# Patient Record
Sex: MALE | Race: WHITE | NOT HISPANIC OR LATINO | Employment: FULL TIME | URBAN - METROPOLITAN AREA
[De-identification: names, ages, dates, MRNs, and addresses within clinical notes are randomized per-mention and may not be internally consistent; named-entity substitution may affect disease eponyms.]

---

## 2017-03-16 DIAGNOSIS — M17.32 POST-TRAUMATIC OSTEOARTHRITIS OF LEFT KNEE: ICD-10-CM

## 2017-03-16 DIAGNOSIS — M19.041 PRIMARY OSTEOARTHRITIS OF BOTH HANDS: ICD-10-CM

## 2017-03-16 DIAGNOSIS — M79.662 PAIN OF LEFT LOWER LEG: ICD-10-CM

## 2017-03-16 DIAGNOSIS — M19.042 PRIMARY OSTEOARTHRITIS OF BOTH HANDS: ICD-10-CM

## 2017-03-16 RX ORDER — CELECOXIB 200 MG/1
200 CAPSULE ORAL 2 TIMES DAILY
Qty: 180 CAP | Refills: 0 | Status: SHIPPED | OUTPATIENT
Start: 2017-03-16 | End: 2017-05-17 | Stop reason: SDUPTHER

## 2017-03-16 RX ORDER — TRAMADOL HYDROCHLORIDE 50 MG/1
50-100 TABLET ORAL 2 TIMES DAILY PRN
Qty: 60 TAB | Refills: 0 | Status: SHIPPED
Start: 2017-03-16 | End: 2017-04-25 | Stop reason: SDUPTHER

## 2017-03-16 NOTE — TELEPHONE ENCOUNTER
"Was the patient seen in the last year in this department? No     Does patient have an active prescription for medications requested? No     Received Request Via: Pharmacy     Last seen\" 03/11/2016 Slots       "

## 2017-03-16 NOTE — Clinical Note
March 16, 2017        Horacio Camargo [address]  [facility] [address]        Dear Horacio:    .This letter is to inform you the you are due for an annual appointment. Please contact our scheduling department at [phone] To schedule       If you have any questions or concerns, please don't hesitate to call.        Sincerely,        Daniel Hernandez M.D.    Electronically Signed

## 2017-03-16 NOTE — TELEPHONE ENCOUNTER
Refill done, please fax. Patient is due for annual appointment. Please have patient schedule.  Daniel Hernandez M.D.

## 2017-04-25 DIAGNOSIS — M17.32 POST-TRAUMATIC OSTEOARTHRITIS OF LEFT KNEE: ICD-10-CM

## 2017-04-25 DIAGNOSIS — M19.042 PRIMARY OSTEOARTHRITIS OF BOTH HANDS: ICD-10-CM

## 2017-04-25 DIAGNOSIS — M79.662 PAIN OF LEFT LOWER LEG: ICD-10-CM

## 2017-04-25 DIAGNOSIS — M19.041 PRIMARY OSTEOARTHRITIS OF BOTH HANDS: ICD-10-CM

## 2017-04-25 RX ORDER — TRAMADOL HYDROCHLORIDE 50 MG/1
50-100 TABLET ORAL 2 TIMES DAILY PRN
Qty: 60 TAB | Refills: 0 | Status: SHIPPED
Start: 2017-04-25 | End: 2017-05-17 | Stop reason: SDUPTHER

## 2017-04-25 NOTE — TELEPHONE ENCOUNTER
Was the patient seen in the last year in this department? Yes     Does patient have an active prescription for medications requested? No     Received Request Via: Pharmacy     Last seen: 03/11/2016 Slots

## 2017-05-02 ENCOUNTER — APPOINTMENT (OUTPATIENT)
Dept: MEDICAL GROUP | Facility: MEDICAL CENTER | Age: 57
End: 2017-05-02
Payer: COMMERCIAL

## 2017-05-17 ENCOUNTER — OFFICE VISIT (OUTPATIENT)
Dept: MEDICAL GROUP | Facility: MEDICAL CENTER | Age: 57
End: 2017-05-17
Payer: COMMERCIAL

## 2017-05-17 VITALS
RESPIRATION RATE: 16 BRPM | TEMPERATURE: 97.9 F | OXYGEN SATURATION: 96 % | HEIGHT: 69 IN | SYSTOLIC BLOOD PRESSURE: 130 MMHG | DIASTOLIC BLOOD PRESSURE: 86 MMHG | WEIGHT: 207 LBS | HEART RATE: 76 BPM | BODY MASS INDEX: 30.66 KG/M2

## 2017-05-17 DIAGNOSIS — M19.041 PRIMARY OSTEOARTHRITIS OF BOTH HANDS: ICD-10-CM

## 2017-05-17 DIAGNOSIS — Z00.00 ANNUAL PHYSICAL EXAM: ICD-10-CM

## 2017-05-17 DIAGNOSIS — M19.042 PRIMARY OSTEOARTHRITIS OF BOTH HANDS: ICD-10-CM

## 2017-05-17 DIAGNOSIS — M54.41 CHRONIC MIDLINE LOW BACK PAIN WITH RIGHT-SIDED SCIATICA: ICD-10-CM

## 2017-05-17 DIAGNOSIS — E66.9 OBESITY (BMI 30-39.9): ICD-10-CM

## 2017-05-17 DIAGNOSIS — F51.01 PRIMARY INSOMNIA: ICD-10-CM

## 2017-05-17 DIAGNOSIS — G89.29 CHRONIC MIDLINE LOW BACK PAIN WITH RIGHT-SIDED SCIATICA: ICD-10-CM

## 2017-05-17 DIAGNOSIS — E80.4 GILBERT'S DISEASE: ICD-10-CM

## 2017-05-17 DIAGNOSIS — K21.9 GASTROESOPHAGEAL REFLUX DISEASE, ESOPHAGITIS PRESENCE NOT SPECIFIED: ICD-10-CM

## 2017-05-17 DIAGNOSIS — M17.32 POST-TRAUMATIC OSTEOARTHRITIS OF LEFT KNEE: ICD-10-CM

## 2017-05-17 PROCEDURE — 99396 PREV VISIT EST AGE 40-64: CPT | Performed by: FAMILY MEDICINE

## 2017-05-17 RX ORDER — CELECOXIB 200 MG/1
200 CAPSULE ORAL 2 TIMES DAILY
Qty: 180 CAP | Refills: 3 | Status: SHIPPED | OUTPATIENT
Start: 2017-05-17 | End: 2017-12-08

## 2017-05-17 RX ORDER — TRAMADOL HYDROCHLORIDE 50 MG/1
50-100 TABLET ORAL 2 TIMES DAILY PRN
Qty: 180 TAB | Refills: 1 | Status: SHIPPED
Start: 2017-05-17 | End: 2017-08-24 | Stop reason: SDUPTHER

## 2017-05-17 RX ORDER — CALCIUM CARBONATE 500 MG/1
500 TABLET, CHEWABLE ORAL PRN
COMMUNITY
Start: 2017-05-17

## 2017-05-17 NOTE — MR AVS SNAPSHOT
"        Horacio Mcgarry   2017 4:40 PM   Office Visit   MRN: [id number]    Department:  [facility]   Dept Phone:  [phone]    Description:  Male : 1960   Provider:  Daniel Hernandez M.D.           Reason for Visit     Follow-Up Medication      Allergies as of 2017     No Known Allergies      You were diagnosed with     Annual physical exam   [513731]       Post-traumatic osteoarthritis of left knee   [189682]       Primary osteoarthritis of both hands   [1534062]       Chronic midline low back pain with right-sided sciatica   [4072542]       Gilbert's disease   [355347]       Primary insomnia   [171551]       Gastroesophageal reflux disease, esophagitis presence not specified   [0258346]       Obesity (BMI 30-39.9)   [733108]         Vital Signs     Blood Pressure Pulse Temperature Respirations Height Weight    130/86 mmHg 76 36.6 °C (97.9 °F) 16 1.753 m (5' 9\") 93.895 kg (207 lb)    Body Mass Index Oxygen Saturation Smoking Status             30.55 kg/m2 96% Never Smoker          Basic Information     Date Of Birth Sex Race Ethnicity Preferred Language    1960 Male White Non- English      Problem List              ICD-10-CM Priority Class Noted - Resolved    Insomnia G47.00   2014 - Present    Osteoarthritis of hand M19.049   2014 - Present    Post-traumatic osteoarthritis of left knee M17.32   2014 - Present    GERD (gastroesophageal reflux disease) K21.9   2014 - Present    Gilbert's disease E80.4   2014 - Present    Chronic midline low back pain with right-sided sciatica M54.41, G89.29   2017 - Present    Obesity (BMI 30-39.9) E66.9   2017 - Present      Health Maintenance        Date Due Completion Dates    IMM DTaP/Tdap/Td Vaccine (1 - Tdap) 1980 ---    COLONOSCOPY 2011 ---            Current Immunizations     Influenza TIV (IM) 10/28/2014      Below and/or attached are the medications your provider expects you to take. Review " all of your home medications and newly ordered medications with your provider and/or pharmacist. Follow medication instructions as directed by your provider and/or pharmacist. Please keep your medication list with you and share with your provider. Update the information when medications are discontinued, doses are changed, or new medications (including over-the-counter products) are added; and carry medication information at all times in the event of emergency situations     Allergies:  No Known Allergies          Medications  Valid as of: May 17, 2017 -  5:28 PM    Generic Name Brand Name Tablet Size Instructions for use    Calcium Carbonate Antacid (Chew Tab) TUMS 500 MG Take 1 Tab by mouth 1 time daily as needed (heartburn).        Celecoxib (Cap) CELEBREX 200 MG Take 1 Cap by mouth 2 times a day.        TraMADol HCl (Tab) ULTRAM 50 MG Take 1-2 Tabs by mouth 2 times a day as needed for Severe Pain.        Triazolam (Tab) HALCION 0.25 MG Take 1-2 Tabs by mouth at bedtime as needed (sleep).        .                 Medicines prescribed today were sent to:     [facility] [address]     [address] [facility] [address]    Phone: [phone] Fax: [phone]    Open 24 Hours?: No      Medication refill instructions:       If your prescription bottle indicates you have medication refills left, it is not necessary to call your provider’s office. Please contact your pharmacy and they will refill your medication.    If your prescription bottle indicates you do not have any refills left, you may request refills at any time through one of the following ways: The online [email or web address] system (except Urgent Care), by calling your provider’s office, or by asking your pharmacy to contact your provider’s office with a refill request. Medication refills are processed only during regular business hours and may not be available until the next business day. Your provider may request additional  information or to have a follow-up visit with you prior to refilling your medication.   *Please Note: Medication refills are assigned a new Rx number when refilled electronically. Your pharmacy may indicate that no refills were authorized even though a new prescription for the same medication is available at the pharmacy. Please request the medicine by name with the pharmacy before contacting your provider for a refill.        Your To Do List     Future Labs/Procedures Complete By Expires    CBC WITH DIFFERENTIAL  As directed 5/18/2018    COMP METABOLIC PANEL  As directed 5/18/2018    LIPID PROFILE  As directed 5/18/2018    PROSTATE SPECIFIC AG SCREENING  As directed 5/18/2018    TSH WITH REFLEX TO FT4  As directed 5/17/2018    VITAMIN D,25 HYDROXY  As directed 5/18/2018         MyChart Access Code: Activation code not generated  Current Blooie Status: Active

## 2017-05-18 NOTE — PROGRESS NOTES
"Subjective:   Horacio Mcgarry is a 56 y.o. male here today for annual    Patient is tolerating Celebrex 200 mg at night and tramadol 100 mg at night for his various arthritis, and left knee, both hands and lower back. He states his lower back is improved when he does yoga. Recently has been doing lifting for his wife's mother's house, that has caused a flareup of his back pain.    Patient is using triazolam 0.5 mg at night, which does help somewhat.    Patient is taking Tums about 2-3 times per week for acid reflux.    Current medicines (including changes today)  Current Outpatient Prescriptions   Medication Sig Dispense Refill   • celecoxib (CELEBREX) 200 MG Cap Take 1 Cap by mouth 2 times a day. 180 Cap 3   • tramadol (ULTRAM) 50 MG Tab Take 1-2 Tabs by mouth 2 times a day as needed for Severe Pain. 180 Tab 1   • calcium carbonate (TUMS) 500 MG Chew Tab Take 1 Tab by mouth 1 time daily as needed (heartburn).     • triazolam (HALCION) 0.25 MG Tab Take 1-2 Tabs by mouth at bedtime as needed (sleep). 60 Tab 5     No current facility-administered medications for this visit.     He  has a past medical history of Insomnia (11/7/2014); Osteoarthritis of hand (11/7/2014); Pain of left lower leg (11/7/2014); and GERD (gastroesophageal reflux disease) (11/7/2014).    ROS   No chest pain, no shortness of breath, no abdominal pain       Objective:     Blood pressure 130/86, pulse 76, temperature 36.6 °C (97.9 °F), resp. rate 16, height 1.753 m (5' 9\"), weight 93.895 kg (207 lb), SpO2 96 %. Body mass index is 30.55 kg/(m^2).   Physical Exam:  Constitutional: Alert, no distress.  Skin: Warm, dry, good turgor, no rashes in visible areas.  Eye: Equal, round and reactive, conjunctiva clear, lids normal.  ENMT: Lips without lesions, good dentition, oropharynx clear.  Neck: Trachea midline, no masses, no thyromegaly. No cervical or supraclavicular lymphadenopathy  Respiratory: Unlabored respiratory effort, lungs clear to auscultation, " no wheezes, no ronchi.  Cardiovascular: Normal S1, S2, no murmur, no edema.  Abdomen: Soft, non-tender, no masses, no hepatosplenomegaly.  Psych: Alert and oriented x3, normal affect and mood.  Rectal: No external lesions, normal sphincter tone, normal size prostate with no nodularity.      Assessment and Plan:   The following treatment plan was discussed    1. Annual physical exam  Check labs and call with results.  - PROSTATE SPECIFIC AG SCREENING; Future  - CBC WITH DIFFERENTIAL; Future  - COMP METABOLIC PANEL; Future  - LIPID PROFILE; Future  - TSH WITH REFLEX TO FT4; Future  - VITAMIN D,25 HYDROXY; Future    2. Post-traumatic osteoarthritis of left knee  Controlled. Continue current medication.  - celecoxib (CELEBREX) 200 MG Cap; Take 1 Cap by mouth 2 times a day.  Dispense: 180 Cap; Refill: 3  - tramadol (ULTRAM) 50 MG Tab; Take 1-2 Tabs by mouth 2 times a day as needed for Severe Pain.  Dispense: 180 Tab; Refill: 1    3. Primary osteoarthritis of both hands  Controlled. Continue current medication.  - celecoxib (CELEBREX) 200 MG Cap; Take 1 Cap by mouth 2 times a day.  Dispense: 180 Cap; Refill: 3  - tramadol (ULTRAM) 50 MG Tab; Take 1-2 Tabs by mouth 2 times a day as needed for Severe Pain.  Dispense: 180 Tab; Refill: 1    4. Chronic midline low back pain with right-sided sciatica  Controlled. Continue current medication.    5. Gilbert's disease  Check labs and call with results.    6. Primary insomnia  Controlled. Continue current medication.    7. Gastroesophageal reflux disease, esophagitis presence not specified  Advised patient that if GERD increases, we should consider daily medication.    8. Obesity (BMI 30-39.9)  - Patient identified as having weight management issue.  Appropriate orders and counseling given.      Followup: Return in about 1 year (around 5/17/2018) for Annual, Long.

## 2017-05-19 ENCOUNTER — TELEPHONE (OUTPATIENT)
Dept: MEDICAL GROUP | Facility: MEDICAL CENTER | Age: 57
End: 2017-05-19

## 2017-05-19 NOTE — TELEPHONE ENCOUNTER
----- Message from Daniel Hernandez M.D. sent at 5/19/2017  8:48 AM PDT -----  Please notify patient that PSA is 2.9 and last year it was 3.9. Less than 4 is the normal range, so PSA going down is a good thing.  We should recheck PSA every 1-2 years.  Daniel Hernandez M.D.

## 2017-08-24 DIAGNOSIS — M17.32 POST-TRAUMATIC OSTEOARTHRITIS OF LEFT KNEE: ICD-10-CM

## 2017-08-24 DIAGNOSIS — M19.041 PRIMARY OSTEOARTHRITIS OF BOTH HANDS: ICD-10-CM

## 2017-08-24 DIAGNOSIS — M19.042 PRIMARY OSTEOARTHRITIS OF BOTH HANDS: ICD-10-CM

## 2017-08-25 RX ORDER — TRAMADOL HYDROCHLORIDE 50 MG/1
50-100 TABLET ORAL 2 TIMES DAILY PRN
Qty: 180 TAB | Refills: 1 | Status: SHIPPED
Start: 2017-08-25 | End: 2017-12-08

## 2017-08-25 NOTE — TELEPHONE ENCOUNTER
Was the patient seen in the last year in this department? Yes     Does patient have an active prescription for medications requested? No     Received Request Via: Pharmacy     Last seen: 05/17/2017 Slots

## 2017-12-08 ENCOUNTER — HOSPITAL ENCOUNTER (OUTPATIENT)
Dept: LAB | Facility: MEDICAL CENTER | Age: 57
End: 2017-12-08
Attending: FAMILY MEDICINE
Payer: COMMERCIAL

## 2017-12-08 ENCOUNTER — OFFICE VISIT (OUTPATIENT)
Dept: MEDICAL GROUP | Facility: MEDICAL CENTER | Age: 57
End: 2017-12-08
Payer: COMMERCIAL

## 2017-12-08 VITALS
SYSTOLIC BLOOD PRESSURE: 132 MMHG | WEIGHT: 208.2 LBS | HEIGHT: 69 IN | BODY MASS INDEX: 30.84 KG/M2 | HEART RATE: 54 BPM | DIASTOLIC BLOOD PRESSURE: 82 MMHG | OXYGEN SATURATION: 97 % | TEMPERATURE: 97.5 F

## 2017-12-08 DIAGNOSIS — E66.9 OBESITY (BMI 30-39.9): ICD-10-CM

## 2017-12-08 DIAGNOSIS — Z01.00 ENCOUNTER FOR VISION SCREENING: ICD-10-CM

## 2017-12-08 DIAGNOSIS — K21.9 GASTROESOPHAGEAL REFLUX DISEASE, ESOPHAGITIS PRESENCE NOT SPECIFIED: ICD-10-CM

## 2017-12-08 PROCEDURE — 83013 H PYLORI (C-13) BREATH: CPT

## 2017-12-08 PROCEDURE — 99214 OFFICE O/P EST MOD 30 MIN: CPT | Performed by: FAMILY MEDICINE

## 2017-12-08 RX ORDER — OMEPRAZOLE 40 MG/1
40 CAPSULE, DELAYED RELEASE ORAL DAILY
Qty: 90 CAP | Refills: 1 | Status: SHIPPED | OUTPATIENT
Start: 2017-12-08 | End: 2018-02-23

## 2017-12-08 RX ORDER — SUCRALFATE 1 G/1
1 TABLET ORAL
Qty: 120 TAB | Refills: 0 | Status: SHIPPED | OUTPATIENT
Start: 2017-12-08 | End: 2019-03-22

## 2017-12-08 ASSESSMENT — PATIENT HEALTH QUESTIONNAIRE - PHQ9: CLINICAL INTERPRETATION OF PHQ2 SCORE: 0

## 2017-12-08 NOTE — ASSESSMENT & PLAN NOTE
Having severe heart burn for the last 3 months. Associated with eating foods, tomato sauce, chocolate, coffee, spicy food and not eating makes it worse. Symptoms occur all day. Appetite is reduced.  Tried Prilosec for 4 weeks and Pepcid for 2 weeks with no improvement. Sleeping on a few pillows to help.  EGD in 2007 which was negative.  Worse than ever before.  Associated Sore throat.  No NSAID use recently. Not gaining weight, actually he says he has lost about 10 pounds recently.  No dark stools, no vomiting, no constipation, no fever.

## 2017-12-08 NOTE — PROGRESS NOTES
"Subjective:   Horacio Mcgarry is a 57 y.o. male here today for GERD    GERD (gastroesophageal reflux disease)  Having severe heart burn for the last 3 months. Associated with eating foods, tomato sauce, chocolate, coffee, spicy food and not eating makes it worse. Symptoms occur all day. Appetite is reduced.  Tried Prilosec for 4 weeks and Pepcid for 2 weeks with no improvement. Sleeping on a few pillows to help.  EGD in 2007 which was negative.  Worse than ever before.  Associated Sore throat.  No NSAID use recently. Not gaining weight, actually he says he has lost about 10 pounds recently.  No dark stools, no vomiting, no constipation, no fever.    Obesity (BMI 30-39.9)  Working out regularly. He states he has lost 10 pounds.         Current medicines (including changes today)  Current Outpatient Prescriptions   Medication Sig Dispense Refill   • omeprazole (PRILOSEC) 40 MG delayed-release capsule Take 1 Cap by mouth every day. 90 Cap 1   • sucralfate (CARAFATE) 1 GM Tab Take 1 Tab by mouth 4 Times a Day,Before Meals and at Bedtime. 120 Tab 0   • triazolam (HALCION) 0.25 MG Tab Take 1-2 Tabs by mouth at bedtime as needed (sleep). 60 Tab 5   • calcium carbonate (TUMS) 500 MG Chew Tab Take 1 Tab by mouth 1 time daily as needed (heartburn).       No current facility-administered medications for this visit.      He  has a past medical history of GERD (gastroesophageal reflux disease) (11/7/2014); Insomnia (11/7/2014); Osteoarthritis of hand (11/7/2014); and Pain of left lower leg (11/7/2014).    ROS   See HPI       Objective:     Blood pressure 132/82, pulse (!) 54, temperature 36.4 °C (97.5 °F), height 1.753 m (5' 9\"), weight 94.4 kg (208 lb 3.2 oz), SpO2 97 %. Body mass index is 30.75 kg/m².   Physical Exam:  Constitutional: Alert, no distress.  Skin: Warm, dry, good turgor, no rashes in visible areas.  Eye: Equal, round and reactive, conjunctiva clear, lids normal.  Abdomen: Soft, non-tender, no masses, no " hepatosplenomegaly.  Psych: Alert and oriented x3, normal affect and mood.        Assessment and Plan:   The following treatment plan was discussed    1. Gastroesophageal reflux disease, esophagitis presence not specified  Check H. pylori breath test, call with results. Start patient on Prilosec 40 mg daily and Carafate. If no improvement patient will need referral for EGD.  - H. PYLORI BREATH TEST  - omeprazole (PRILOSEC) 40 MG delayed-release capsule; Take 1 Cap by mouth every day.  Dispense: 90 Cap; Refill: 1    2. Obesity (BMI 30-39.9)  - Patient identified as having weight management issue.  Appropriate orders and counseling given.    3. Encounter for vision screening  Referral to optometry per patient's request.  - REFERRAL TO OPTOMETRY      Followup: Return if symptoms worsen or fail to improve.

## 2017-12-09 LAB — UREA BREATH TEST QL: NEGATIVE

## 2017-12-11 ENCOUNTER — TELEPHONE (OUTPATIENT)
Dept: MEDICAL GROUP | Facility: MEDICAL CENTER | Age: 57
End: 2017-12-11

## 2018-01-04 ENCOUNTER — PATIENT MESSAGE (OUTPATIENT)
Dept: MEDICAL GROUP | Facility: MEDICAL CENTER | Age: 58
End: 2018-01-04

## 2018-01-04 DIAGNOSIS — K21.9 GASTROESOPHAGEAL REFLUX DISEASE, ESOPHAGITIS PRESENCE NOT SPECIFIED: ICD-10-CM

## 2018-01-04 NOTE — TELEPHONE ENCOUNTER
From: Horacio Mcgarry  To: Daniel Hernandez M.D.  Sent: 1/4/2018 2:48 PM PST  Subject: Procedure Question    Hi Dr. Gill, I am still having reflux issues, it got a little better the last month, but is back pretty heavy, I have changed my diet drastically, and have lost some more weight the last month due to not eating much, should I see you again or is the next step a gastroenterologist ? Thanks Horacio Mcgarry

## 2018-01-19 ENCOUNTER — APPOINTMENT (OUTPATIENT)
Dept: RADIOLOGY | Facility: MEDICAL CENTER | Age: 58
End: 2018-01-19
Attending: INTERNAL MEDICINE
Payer: COMMERCIAL

## 2018-01-19 DIAGNOSIS — R12 HEARTBURN: ICD-10-CM

## 2018-01-19 PROCEDURE — 74220 X-RAY XM ESOPHAGUS 1CNTRST: CPT

## 2018-01-19 PROCEDURE — 700112 HCHG RX REV CODE 229: Performed by: INTERNAL MEDICINE

## 2018-01-19 PROCEDURE — A9270 NON-COVERED ITEM OR SERVICE: HCPCS | Performed by: INTERNAL MEDICINE

## 2018-01-19 PROCEDURE — 700101 HCHG RX REV CODE 250: Performed by: INTERNAL MEDICINE

## 2018-01-19 RX ADMIN — ANTACID/ANTIFLATULENT 1 PACKET: 380; 550; 10; 10 GRANULE, EFFERVESCENT ORAL at 13:30

## 2018-01-19 RX ADMIN — BARIUM SULFATE 700 MG: 700 TABLET ORAL at 13:39

## 2018-01-31 ENCOUNTER — HOSPITAL ENCOUNTER (OUTPATIENT)
Dept: RADIOLOGY | Facility: MEDICAL CENTER | Age: 58
End: 2018-01-31
Attending: INTERNAL MEDICINE
Payer: COMMERCIAL

## 2018-01-31 DIAGNOSIS — R07.89 OTHER CHEST PAIN: ICD-10-CM

## 2018-01-31 DIAGNOSIS — K21.9 GASTROESOPHAGEAL REFLUX DISEASE WITHOUT ESOPHAGITIS: ICD-10-CM

## 2018-01-31 DIAGNOSIS — R12 HEARTBURN: ICD-10-CM

## 2018-01-31 PROCEDURE — 700117 HCHG RX CONTRAST REV CODE 255: Performed by: INTERNAL MEDICINE

## 2018-01-31 PROCEDURE — 74160 CT ABDOMEN W/CONTRAST: CPT

## 2018-01-31 RX ADMIN — IOHEXOL 90 ML: 350 INJECTION, SOLUTION INTRAVENOUS at 14:53

## 2018-02-02 ENCOUNTER — PATIENT MESSAGE (OUTPATIENT)
Dept: MEDICAL GROUP | Facility: MEDICAL CENTER | Age: 58
End: 2018-02-02

## 2018-02-02 RX ORDER — AZITHROMYCIN 250 MG/1
TABLET, FILM COATED ORAL
Qty: 6 TAB | Refills: 0 | Status: SHIPPED | OUTPATIENT
Start: 2018-02-02 | End: 2018-02-07

## 2018-02-02 NOTE — TELEPHONE ENCOUNTER
From: Horacio Mcgarry  To: Daniel Hernandez M.D.  Sent: 2/2/2018 9:06 AM PST  Subject: Non-Urgent Medical Question    Hi Dr. Hernandez, I have had a bad , deep cough with no fever , semi productive off and on with thick phlegm for 14 days, some congestion in the sinuses , am I able to get some antibotics called in? Or do You need to see me. Thanks Horacio Mcgarry   [phone]

## 2018-02-06 ENCOUNTER — TELEPHONE (OUTPATIENT)
Dept: MEDICAL GROUP | Facility: MEDICAL CENTER | Age: 58
End: 2018-02-06

## 2018-02-07 ENCOUNTER — PATIENT MESSAGE (OUTPATIENT)
Dept: MEDICAL GROUP | Facility: MEDICAL CENTER | Age: 58
End: 2018-02-07

## 2018-02-07 DIAGNOSIS — N20.0 RENAL CALCULUS OR STONE: ICD-10-CM

## 2018-02-21 ENCOUNTER — TELEPHONE (OUTPATIENT)
Dept: MEDICAL GROUP | Facility: MEDICAL CENTER | Age: 58
End: 2018-02-21

## 2018-02-21 DIAGNOSIS — N20.0 CALCIUM NEPHROLITHIASIS: ICD-10-CM

## 2018-02-21 NOTE — TELEPHONE ENCOUNTER
1. Caller Name: Cherri from [facility]                                         Call Back Number: [phone] ext: [phone]      Patient approves a detailed voicemail message: yes    Calling to report Pt's CT showed There is a 1 cm calcification posteriorly in the left kidney which is nonobstructive. Asking if PCP will follow and refer out to Urology or does Dr. Sarmiento need to do this?

## 2018-02-21 NOTE — TELEPHONE ENCOUNTER
I placed a urology referral on 2/7 and the referral has been approved.  Urology [place] [address]  [facility] [address]  Phone: [phone]           Daniel Hernandez M.D.

## 2018-02-23 ENCOUNTER — HOSPITAL ENCOUNTER (OUTPATIENT)
Dept: RADIOLOGY | Facility: MEDICAL CENTER | Age: 58
End: 2018-02-23
Attending: UROLOGY
Payer: COMMERCIAL

## 2018-02-23 DIAGNOSIS — Z01.812 PRE-OPERATIVE LABORATORY EXAMINATION: ICD-10-CM

## 2018-02-23 DIAGNOSIS — Z01.810 PRE-OPERATIVE CARDIOVASCULAR EXAMINATION: ICD-10-CM

## 2018-02-23 DIAGNOSIS — Z01.811 PRE-OPERATIVE RESPIRATORY EXAMINATION: ICD-10-CM

## 2018-02-23 LAB
ANION GAP SERPL CALC-SCNC: 5 MMOL/L (ref 0–11.9)
APPEARANCE UR: CLEAR
BACTERIA #/AREA URNS HPF: NEGATIVE /HPF
BILIRUB UR QL STRIP.AUTO: NEGATIVE
BUN SERPL-MCNC: 15 MG/DL (ref 8–22)
CALCIUM SERPL-MCNC: 9.2 MG/DL (ref 8.5–10.5)
CHLORIDE SERPL-SCNC: 104 MMOL/L (ref 96–112)
CO2 SERPL-SCNC: 28 MMOL/L (ref 20–33)
COLOR UR: YELLOW
CREAT SERPL-MCNC: 0.88 MG/DL (ref 0.5–1.4)
EKG IMPRESSION: NORMAL
EPI CELLS #/AREA URNS HPF: NEGATIVE /HPF
ERYTHROCYTE [DISTWIDTH] IN BLOOD BY AUTOMATED COUNT: 43.1 FL (ref 35.9–50)
GLUCOSE SERPL-MCNC: 81 MG/DL (ref 65–99)
GLUCOSE UR STRIP.AUTO-MCNC: NEGATIVE MG/DL
HCT VFR BLD AUTO: 47.1 % (ref 42–52)
HGB BLD-MCNC: 16.6 G/DL (ref 14–18)
HYALINE CASTS #/AREA URNS LPF: ABNORMAL /LPF
KETONES UR STRIP.AUTO-MCNC: NEGATIVE MG/DL
LEUKOCYTE ESTERASE UR QL STRIP.AUTO: ABNORMAL
MCH RBC QN AUTO: 31.3 PG (ref 27–33)
MCHC RBC AUTO-ENTMCNC: 35.2 G/DL (ref 33.7–35.3)
MCV RBC AUTO: 88.9 FL (ref 81.4–97.8)
MICRO URNS: ABNORMAL
NITRITE UR QL STRIP.AUTO: NEGATIVE
PH UR STRIP.AUTO: 5.5 [PH]
PLATELET # BLD AUTO: 211 K/UL (ref 164–446)
PMV BLD AUTO: 9.7 FL (ref 9–12.9)
POTASSIUM SERPL-SCNC: 3.8 MMOL/L (ref 3.6–5.5)
PROT UR QL STRIP: NEGATIVE MG/DL
RBC # BLD AUTO: 5.3 M/UL (ref 4.7–6.1)
RBC # URNS HPF: ABNORMAL /HPF
RBC UR QL AUTO: NEGATIVE
SODIUM SERPL-SCNC: 137 MMOL/L (ref 135–145)
SP GR UR STRIP.AUTO: 1.01
UROBILINOGEN UR STRIP.AUTO-MCNC: 0.2 MG/DL
WBC # BLD AUTO: 5.6 K/UL (ref 4.8–10.8)
WBC #/AREA URNS HPF: ABNORMAL /HPF

## 2018-02-23 PROCEDURE — 36415 COLL VENOUS BLD VENIPUNCTURE: CPT

## 2018-02-23 PROCEDURE — 93005 ELECTROCARDIOGRAM TRACING: CPT

## 2018-02-23 PROCEDURE — 80048 BASIC METABOLIC PNL TOTAL CA: CPT

## 2018-02-23 PROCEDURE — 81001 URINALYSIS AUTO W/SCOPE: CPT

## 2018-02-23 PROCEDURE — 71045 X-RAY EXAM CHEST 1 VIEW: CPT

## 2018-02-23 PROCEDURE — 93010 ELECTROCARDIOGRAM REPORT: CPT | Performed by: INTERNAL MEDICINE

## 2018-02-23 PROCEDURE — 85027 COMPLETE CBC AUTOMATED: CPT

## 2018-02-23 PROCEDURE — 87086 URINE CULTURE/COLONY COUNT: CPT

## 2018-02-23 RX ORDER — PANTOPRAZOLE SODIUM 40 MG/1
40 TABLET, DELAYED RELEASE ORAL 2 TIMES DAILY
COMMUNITY
End: 2019-03-22

## 2018-02-23 NOTE — OR NURSING
"Pre-admit appointment completed. \"Preparing for your procedure\" sheet given to pt along with verbal and written instructions. Pt instructed to continue regularly prescribed medications through the day before surgery. Pt instructed to take the following medications the day of surgery with a sip of water, per anesthesia protocol; carafate, pantoprazole.  "

## 2018-02-25 LAB
BACTERIA UR CULT: NORMAL
SIGNIFICANT IND 70042: NORMAL
SITE SITE: NORMAL
SOURCE SOURCE: NORMAL

## 2018-03-08 ENCOUNTER — APPOINTMENT (OUTPATIENT)
Dept: RADIOLOGY | Facility: MEDICAL CENTER | Age: 58
End: 2018-03-08
Attending: UROLOGY
Payer: COMMERCIAL

## 2018-03-08 ENCOUNTER — HOSPITAL ENCOUNTER (OUTPATIENT)
Facility: MEDICAL CENTER | Age: 58
End: 2018-03-08
Attending: UROLOGY | Admitting: UROLOGY
Payer: COMMERCIAL

## 2018-03-08 VITALS
BODY MASS INDEX: 29.71 KG/M2 | OXYGEN SATURATION: 95 % | DIASTOLIC BLOOD PRESSURE: 84 MMHG | TEMPERATURE: 97.7 F | WEIGHT: 200.62 LBS | RESPIRATION RATE: 16 BRPM | HEIGHT: 69 IN | SYSTOLIC BLOOD PRESSURE: 128 MMHG | HEART RATE: 63 BPM

## 2018-03-08 DIAGNOSIS — N20.0 RENAL CALCULUS OR STONE: ICD-10-CM

## 2018-03-08 PROCEDURE — 700105 HCHG RX REV CODE 258: Performed by: UROLOGY

## 2018-03-08 PROCEDURE — 160025 RECOVERY II MINUTES (STATS): Performed by: UROLOGY

## 2018-03-08 PROCEDURE — 160039 HCHG SURGERY MINUTES - EA ADDL 1 MIN LEVEL 3: Performed by: UROLOGY

## 2018-03-08 PROCEDURE — A9270 NON-COVERED ITEM OR SERVICE: HCPCS | Performed by: UROLOGY

## 2018-03-08 PROCEDURE — 700111 HCHG RX REV CODE 636 W/ 250 OVERRIDE (IP)

## 2018-03-08 PROCEDURE — 160046 HCHG PACU - 1ST 60 MINS PHASE II: Performed by: UROLOGY

## 2018-03-08 PROCEDURE — 160002 HCHG RECOVERY MINUTES (STAT): Performed by: UROLOGY

## 2018-03-08 PROCEDURE — C1758 CATHETER, URETERAL: HCPCS | Performed by: UROLOGY

## 2018-03-08 PROCEDURE — 160048 HCHG OR STATISTICAL LEVEL 1-5: Performed by: UROLOGY

## 2018-03-08 PROCEDURE — 700102 HCHG RX REV CODE 250 W/ 637 OVERRIDE(OP)

## 2018-03-08 PROCEDURE — 501329 HCHG SET, CYSTO IRRIG Y TUR: Performed by: UROLOGY

## 2018-03-08 PROCEDURE — C1894 INTRO/SHEATH, NON-LASER: HCPCS | Performed by: UROLOGY

## 2018-03-08 PROCEDURE — 700101 HCHG RX REV CODE 250

## 2018-03-08 PROCEDURE — C2617 STENT, NON-COR, TEM W/O DEL: HCPCS | Performed by: UROLOGY

## 2018-03-08 PROCEDURE — C1769 GUIDE WIRE: HCPCS | Performed by: UROLOGY

## 2018-03-08 PROCEDURE — A9270 NON-COVERED ITEM OR SERVICE: HCPCS

## 2018-03-08 PROCEDURE — 160009 HCHG ANES TIME/MIN: Performed by: UROLOGY

## 2018-03-08 PROCEDURE — 160035 HCHG PACU - 1ST 60 MINS PHASE I: Performed by: UROLOGY

## 2018-03-08 PROCEDURE — 160036 HCHG PACU - EA ADDL 30 MINS PHASE I: Performed by: UROLOGY

## 2018-03-08 PROCEDURE — 160028 HCHG SURGERY MINUTES - 1ST 30 MINS LEVEL 3: Performed by: UROLOGY

## 2018-03-08 PROCEDURE — C1726 CATH, BAL DIL, NON-VASCULAR: HCPCS | Performed by: UROLOGY

## 2018-03-08 PROCEDURE — 74018 RADEX ABDOMEN 1 VIEW: CPT

## 2018-03-08 PROCEDURE — 700117 HCHG RX CONTRAST REV CODE 255

## 2018-03-08 PROCEDURE — 700102 HCHG RX REV CODE 250 W/ 637 OVERRIDE(OP): Performed by: UROLOGY

## 2018-03-08 PROCEDURE — 500879 HCHG PACK, CYSTO: Performed by: UROLOGY

## 2018-03-08 DEVICE — STENT UROLOGICAL POLARIS 6X26  ULTRA: Type: IMPLANTABLE DEVICE | Status: FUNCTIONAL

## 2018-03-08 RX ORDER — CIPROFLOXACIN 500 MG/1
500 TABLET, FILM COATED ORAL 2 TIMES DAILY
Qty: 10 TAB | Refills: 0 | Status: SHIPPED | OUTPATIENT
Start: 2018-03-08 | End: 2019-03-22

## 2018-03-08 RX ORDER — ATROPA BELLADONNA AND OPIUM 16.2; 6 MG/1; MG/1
60 SUPPOSITORY RECTAL
Status: DISCONTINUED | OUTPATIENT
Start: 2018-03-08 | End: 2018-03-08

## 2018-03-08 RX ORDER — PHENAZOPYRIDINE HYDROCHLORIDE 200 MG/1
200 TABLET, FILM COATED ORAL 3 TIMES DAILY PRN
Qty: 12 TAB | Refills: 0 | Status: SHIPPED | OUTPATIENT
Start: 2018-03-08 | End: 2019-03-22

## 2018-03-08 RX ORDER — OXYBUTYNIN CHLORIDE 5 MG/1
5 TABLET ORAL 3 TIMES DAILY
Qty: 20 TAB | Refills: 0 | Status: SHIPPED | OUTPATIENT
Start: 2018-03-08 | End: 2018-03-08

## 2018-03-08 RX ORDER — PHENAZOPYRIDINE HYDROCHLORIDE 200 MG/1
TABLET, FILM COATED ORAL
Status: COMPLETED
Start: 2018-03-08 | End: 2018-03-08

## 2018-03-08 RX ORDER — OXYCODONE HYDROCHLORIDE AND ACETAMINOPHEN 5; 325 MG/1; MG/1
1-2 TABLET ORAL EVERY 4 HOURS PRN
Qty: 15 TAB | Refills: 0 | Status: SHIPPED | OUTPATIENT
Start: 2018-03-08 | End: 2018-03-15

## 2018-03-08 RX ORDER — OXYBUTYNIN CHLORIDE 5 MG/1
5 TABLET ORAL 3 TIMES DAILY
Qty: 20 TAB | Refills: 0 | Status: SHIPPED | OUTPATIENT
Start: 2018-03-08 | End: 2019-03-22

## 2018-03-08 RX ORDER — SODIUM CHLORIDE, SODIUM LACTATE, POTASSIUM CHLORIDE, CALCIUM CHLORIDE 600; 310; 30; 20 MG/100ML; MG/100ML; MG/100ML; MG/100ML
INJECTION, SOLUTION INTRAVENOUS
Status: DISCONTINUED | OUTPATIENT
Start: 2018-03-08 | End: 2018-03-08 | Stop reason: HOSPADM

## 2018-03-08 RX ORDER — OXYCODONE HCL 5 MG/5 ML
SOLUTION, ORAL ORAL
Status: COMPLETED
Start: 2018-03-08 | End: 2018-03-08

## 2018-03-08 RX ORDER — LIDOCAINE HYDROCHLORIDE 10 MG/ML
INJECTION, SOLUTION INFILTRATION; PERINEURAL
Status: COMPLETED
Start: 2018-03-08 | End: 2018-03-08

## 2018-03-08 RX ORDER — OXYCODONE HYDROCHLORIDE AND ACETAMINOPHEN 5; 325 MG/1; MG/1
1 TABLET ORAL EVERY 4 HOURS PRN
Status: DISCONTINUED | OUTPATIENT
Start: 2018-03-08 | End: 2018-03-08 | Stop reason: HOSPADM

## 2018-03-08 RX ORDER — PHENAZOPYRIDINE HYDROCHLORIDE 200 MG/1
200 TABLET, FILM COATED ORAL
Status: COMPLETED | OUTPATIENT
Start: 2018-03-08 | End: 2018-03-08

## 2018-03-08 RX ORDER — CIPROFLOXACIN 500 MG/1
500 TABLET, FILM COATED ORAL 2 TIMES DAILY
Qty: 10 TAB | Refills: 0 | Status: SHIPPED | OUTPATIENT
Start: 2018-03-08 | End: 2018-03-08

## 2018-03-08 RX ADMIN — SODIUM CHLORIDE, POTASSIUM CHLORIDE, SODIUM LACTATE AND CALCIUM CHLORIDE: 600; 310; 30; 20 INJECTION, SOLUTION INTRAVENOUS at 08:01

## 2018-03-08 RX ADMIN — FENTANYL CITRATE 25 MCG: 50 INJECTION, SOLUTION INTRAMUSCULAR; INTRAVENOUS at 11:00

## 2018-03-08 RX ADMIN — PHENAZOPYRIDINE HYDROCHLORIDE 200 MG: 200 TABLET ORAL at 10:33

## 2018-03-08 RX ADMIN — FENTANYL CITRATE 25 MCG: 50 INJECTION, SOLUTION INTRAMUSCULAR; INTRAVENOUS at 11:05

## 2018-03-08 RX ADMIN — PHENAZOPYRIDINE HYDROCHLORIDE 200 MG: 200 TABLET, FILM COATED ORAL at 10:35

## 2018-03-08 RX ADMIN — OXYCODONE HYDROCHLORIDE 10 MG: 5 SOLUTION ORAL at 11:00

## 2018-03-08 RX ADMIN — ATROPA BELLADONNA AND OPIUM 60 MG: 16.2; 3 SUPPOSITORY RECTAL at 10:51

## 2018-03-08 RX ADMIN — LIDOCAINE HYDROCHLORIDE 0.2 ML: 10 INJECTION, SOLUTION INFILTRATION; PERINEURAL at 08:01

## 2018-03-08 ASSESSMENT — PAIN SCALES - GENERAL
PAINLEVEL_OUTOF10: 0

## 2018-03-08 NOTE — OR NURSING
1121 patient to stage 2  Patient settled in recliner chair post short ambulation from [facility] - pt dressed with assist by CNA. Pt awake and denies nausea and reports tolerable burning to penis/bladder.   1145 Reports mild nausea but resolving with rest. Requesting voiding attempt; ambulated with steady gait and SBA to pre-op BR. Will measure output for record.   1212 D/Héctor to care of family post uneventful stay in PACU 2.

## 2018-03-08 NOTE — OR NURSING
1012 To PACU from OR via gurney, sleeping, respirations spontaneous and non-labored via OPA. OPA dc'd upon arrival. VSS,   1015 Pt states he feels like he has to urinate. Denies pain and nausea. VSS  1035 Gave report to GEORGES Cochran who assumed care of pt   1055 Received report from GEORGES Cochran. Pt able to void at this time. VSS. Pt states he is having burning and some discomfort to penis, plan to medicate. See MAR   1121 Pt meets criteria for stage two at this time. Pt states he is more comfortable at this time and denies nausea. VSS. Report given to GEORGES Márquez.

## 2018-03-08 NOTE — OR NURSING
1035: Rcvd report from GEORGES Warner and assumed care, pt resting comfortably in the gurney, no pain, no nausea, awake and alert, trying to urinate, but not able to yet. Awaiting pharmacy to bring ordered medication for pt.  1050: Pharmacy brought medication, given to pt, tolerated it well. Still no pain or nausea, awake and alert. Tolerating room air.  1055: Report given back to GEORGES Warner and she reassumed care.

## 2018-03-08 NOTE — OR SURGEON
Immediate Post OP Note    PreOp Diagnosis: L renal stone    PostOp Diagnosis: L renal stone; L ureteral stricture    Procedure(s):  CYSTOSCOPY STENT PLACEMENT - STENT - Wound Class: Clean  URETEROSCOPY - Wound Class: Clean  URETERAL DILATATION - Wound Class: Clean    Surgeon(s):  James Johnson M.D.    Anesthesiologist/Type of Anesthesia:  Anesthesiologist: Axel Caicedo D.O.  Anesthesia Technician: Joon Emery/General    Surgical Staff:  Circulator: Arminda Nix R.N.  Relief Circulator: Asha Tellez R.N.  Scrub Person: Adin Zeng    Specimens:  none    Estimated Blood Loss: 50ml    Findings: distal L ureteral stricture    Complications: none    Drain:  [id number] L ureteral stent    3/8/2018 10:05 AM James Johnson

## 2018-03-08 NOTE — OP REPORT
DATE OF SERVICE:  03/08/2018    PREOPERATIVE DIAGNOSIS:  Left renal stone.    POSTOPERATIVE DIAGNOSIS:  Left renal stone and left ureteral stricture.    PROCEDURES PERFORMED:  Cystoscopy, left ureteroscopy, left ureteral dilation   and left ureteral stent placement.    DESCRIPTION OF PROCEDURE:  After obtaining informed consent, the patient was   brought to the operating room.  After the induction of general anesthesia, the   patient was placed in dorsal lithotomy position and his genitalia were   prepped and draped in sterile fashion.  The patient received Cipro as   antibiotic prophylaxis prior to starting the procedure.  A 21-Taiwanese rigid   cystoscope was passed per urethra into the bladder under direct vision.  The   bladder was surveyed in its entirety and was normal in appearance.  Two sensor   wires were passed up the left ureter under fluoroscopic guidance until it   seemed to coil in the renal pelvis, after which a Sejal ureteral dilator   was used to dilate the distal ureter.  There was a spot approximately 3-4 cm   from the ureteral orifice that was difficult to get the [place] ureteral   dilator pass through.  Attempted the inner sheath from the ureteral access   sheath and this also had difficulty passing.  I got a ureteral sequential   dilator kit and was able to dilate the ureter with that and it seemed to   dilate well.  However, the ureteral access sheath would still not pass.  I   then attempted to pass the flexible ureteroscope over the second sensor wire,   but it hung up at that same tight spot in the distal ureter.  I used a   PassPort ureteral dilator to dilate the stricture that was visible on   retrograde pyelogram.  It appeared to dilate somewhat, but the tight spot   would not fully open despite the balloon dilation.  I then attempted again to   pass the flexible ureteroscope over the second sensor wire past that area, but   again would not pass beyond that stricture.  At this  point, I elected to   place a ureteral stent to allow that to further dilate the ureter and come   back in a couple of weeks to perform the ureteroscopy all the way up to the   kidney with the ureter more fully dilated with the ureteral stent in place.    So I then performed a retrograde pyelogram to outline the renal pelvis and   calices for stent placement and then passed a 6-Bengali x 26 cm stent over the   sensor wire until it seemed to coil in the renal pelvis on fluoroscopy as well   as in the bladder on direct vision.  There was good efflux through the stent.    The patient's bladder was then irrigated out and the bladder drained, after   which the scope was removed.  The patient was then awoken from anesthesia and   taken to the recovery room in stable condition.    COMPLICATIONS:  None.    ESTIMATED BLOOD LOSS:  50 mL.    SPECIMENS:  None.    DRAINS:  None.       ____________________________________     MD GRIS Contreras / LUDIVINA    DD:  03/08/2018 10:15:41  DT:  03/08/2018 10:26:24    D#:  [id number]  Job#:  [id number]

## 2018-03-08 NOTE — DISCHARGE INSTRUCTIONS
ACTIVITY: Rest and take it easy for the first 24 hours.  A responsible adult is recommended to remain with you during that time.  It is normal to feel sleepy.  We encourage you to not do anything that requires balance, judgment or coordination.    MILD FLU-LIKE SYMPTOMS ARE NORMAL. YOU MAY EXPERIENCE GENERALIZED MUSCLE ACHES, THROAT IRRITATION, HEADACHE AND/OR SOME NAUSEA.    FOR 24 HOURS DO NOT:  Drive, operate machinery or run household appliances.  Drink beer or alcoholic beverages.   Make important decisions or sign legal documents.    SPECIAL INSTRUCTIONS: Follow up with Dr. Johnson    DIET: To avoid nausea, slowly advance diet as tolerated, avoiding spicy or greasy foods for the first day.  Add more substantial food to your diet according to your p    FOLLOW-UP APPOINTMENT:  A follow-up appointment should be arranged with your doctor ; call to schedule.    You should CALL YOUR PHYSICIAN if you develop:  Fever greater than 101 degrees F.  Pain not relieved by medication, or persistent nausea or vomiting.  Excessive bleeding (blood soaking through dressing) or unexpected drainage from the wound.  Extreme redness or swelling around the incision site, drainage of pus or foul smelling drainage.  Inability to urinate or empty your bladder within 8 hours.  Problems with breathing or chest pain.    You should call 911 if you develop problems with breathing or chest pain.  If you are unable to contact your doctor or surgical center, you should go to the nearest emergency room or urgent care center.  Physician's telephone #: Dr. Johnson [phone]    If any questions arise, call your doctor.  If your doctor is not available, please feel free to call the Surgical Center at [phone].  The Center is open Monday through Friday from 7AM to 7PM.  You can also call the [employer] HOTLINE open 24 hours/day, 7 days/week and speak to a nurse at [phone], or toll free at [phone].    A registered nurse may call you a few  days after your surgery to see how you are doing after your procedure.    MEDICATIONS: Resume taking daily medication.  Take prescribed pain medication with food.  If no medication is prescribed, you may take non-aspirin pain medication if needed.  PAIN MEDICATION CAN BE VERY CONSTIPATING.  Take a stool softener or laxative such as senokot, pericolace, or milk of magnesia if needed.    Prescription given for Pyridium, Cipro, Percocet.  Last pain medication given at 11:00 am 10 mg oxycodone .    If your physician has prescribed pain medication that includes Acetaminophen (Tylenol), do not take additional Acetaminophen (Tylenol) while taking the prescribed medication.    Depression / Suicide Risk    As you are discharged from this [facility] facility, it is important to learn how to keep safe from harming yourself.    Recognize the warning signs:  · Abrupt changes in personality, positive or negative- including increase in energy   · Giving away possessions  · Change in eating patterns- significant weight changes-  positive or negative  · Change in sleeping patterns- unable to sleep or sleeping all the time   · Unwillingness or inability to communicate  · Depression  · Unusual sadness, discouragement and loneliness  · Talk of wanting to die  · Neglect of personal appearance   · Rebelliousness- reckless behavior  · Withdrawal from people/activities they love  · Confusion- inability to concentrate     If you or a loved one observes any of these behaviors or has concerns about self-harm, here's what you can do:  · Talk about it- your feelings and reasons for harming yourself  · Remove any means that you might use to hurt yourself (examples: pills, rope, extension cords, firearm)  · Get professional help from the community (Mental Health, Substance Abuse, psychological counseling)  · Do not be alone:Call your Safe Contact- someone whom you trust who will be there for you.  · Call your local CRISIS HOTLINE 017-2962 or  707-810-3778  · Call your local Children's Mobile Crisis Response Team [place] (954) 497-9169 or www.[employer].Well[employer]  · Call the toll free National Suicide Prevention Hotlines   · National Suicide Prevention Lifeline 163-150-TUDL (7276)  · National [employer] Line Network 800-SUICIDE (527-0481)

## 2018-03-08 NOTE — OR NURSING
0736 pt to pre op to assume care.  3716 Patient allergies and NPO status verified, home medication reconciliation completed and belongings secured. Patient verbalizes understanding of pain scale, expected course of stay and plan of care. Surgical site verified with patient. IV access established. Sequentials placed at bedside.

## 2018-06-19 ENCOUNTER — PATIENT MESSAGE (OUTPATIENT)
Dept: MEDICAL GROUP | Facility: MEDICAL CENTER | Age: 58
End: 2018-06-19

## 2018-06-19 DIAGNOSIS — F51.01 PRIMARY INSOMNIA: ICD-10-CM

## 2018-06-20 NOTE — TELEPHONE ENCOUNTER
From: Horacio Mcgarry  To: Daniel Hernandez M.D.  Sent: 6/19/2018 9:53 PM PDT  Subject: Prescription Question    Hi Dr. Hernandez,   I am out of refills for triazolam 0.25 MG Tabs, for sleep, can I get it refilled? Thank You   Horacio Mcgarry

## 2018-11-02 ENCOUNTER — OFFICE VISIT (OUTPATIENT)
Dept: URGENT CARE | Facility: MEDICAL CENTER | Age: 58
End: 2018-11-02
Payer: COMMERCIAL

## 2018-11-02 VITALS
OXYGEN SATURATION: 97 % | HEIGHT: 69 IN | HEART RATE: 52 BPM | WEIGHT: 206 LBS | TEMPERATURE: 98 F | BODY MASS INDEX: 30.51 KG/M2 | DIASTOLIC BLOOD PRESSURE: 88 MMHG | SYSTOLIC BLOOD PRESSURE: 134 MMHG

## 2018-11-02 DIAGNOSIS — H57.11 DISCOMFORT OF RIGHT EYE: ICD-10-CM

## 2018-11-02 PROCEDURE — 99214 OFFICE O/P EST MOD 30 MIN: CPT | Performed by: NURSE PRACTITIONER

## 2018-11-02 ASSESSMENT — ENCOUNTER SYMPTOMS
NAUSEA: 0
FEVER: 0
BLURRED VISION: 0
EYE REDNESS: 1
EYE DISCHARGE: 0
PHOTOPHOBIA: 0
CHILLS: 0
DOUBLE VISION: 0
EYE PAIN: 1
DIZZINESS: 0
HEADACHES: 0

## 2018-11-02 ASSESSMENT — PATIENT HEALTH QUESTIONNAIRE - PHQ9: CLINICAL INTERPRETATION OF PHQ2 SCORE: 0

## 2018-11-02 NOTE — PROGRESS NOTES
Subjective:      Horacio Mcgarry is a 58 y.o. male who presents with Eye Problem (RIGHT X 3 DAYS)            HPI New problem. 58 year old male with right eye pain x 3 days. He denies any discharge, visual changes, congestion or sensitivity to light. He does notice some redness in lateral corner of eye. Does not endorse any trauma. He does wear contact lenses. He has not done any treatment for this. Tried to get appt with eye doctor but could not access them.  Patient has no known allergies.  Current Outpatient Prescriptions on File Prior to Visit   Medication Sig Dispense Refill   • phenazopyridine (PYRIDIUM) 200 MG Tab Take 1 Tab by mouth 3 times a day as needed. (Patient not taking: Reported on 11/2/2018) 12 Tab 0   • ciprofloxacin (CIPRO) 500 MG Tab Take 1 Tab by mouth 2 times a day. (Patient not taking: Reported on 11/2/2018) 10 Tab 0   • phenazopyridine (PYRIDIUM) 200 MG Tab Take 1 Tab by mouth 3 times a day as needed (Pain on urination). (Patient not taking: Reported on 11/2/2018) 12 Tab 0   • oxybutynin (DITROPAN) 5 MG Tab Take 1 Tab by mouth 3 times a day. (Patient not taking: Reported on 11/2/2018) 20 Tab 0   • pantoprazole (PROTONIX) 40 MG Tablet Delayed Response Take 40 mg by mouth 2 times a day.     • sucralfate (CARAFATE) 1 GM Tab Take 1 Tab by mouth 4 Times a Day,Before Meals and at Bedtime. (Patient not taking: Reported on 11/2/2018) 120 Tab 0   • calcium carbonate (TUMS) 500 MG Chew Tab Take 500 mg by mouth as needed (heartburn).       No current facility-administered medications on file prior to visit.      Social History     Social History   • Marital status:      Spouse name: N/A   • Number of children: N/A   • Years of education: N/A     Occupational History   • Not on file.     Social History Main Topics   • Smoking status: Never Smoker   • Smokeless tobacco: Never Used   • Alcohol use 1.0 oz/week     2 Cans of beer per week      Comment: 2-3 per month   • Drug use: No   • Sexual  "activity: Yes     Partners: Female     Other Topics Concern   • Not on file     Social History Narrative   • No narrative on file     family history includes Cancer in his father and mother; Diabetes in his brother.      Review of Systems   Constitutional: Negative for chills and fever.   HENT: Negative for congestion.    Eyes: Positive for pain and redness. Negative for blurred vision, double vision, photophobia and discharge.   Gastrointestinal: Negative for nausea.   Skin: Negative for itching and rash.   Neurological: Negative for dizziness and headaches.          Objective:     /88 (BP Location: Right arm, Patient Position: Sitting, BP Cuff Size: Adult)   Pulse (!) 52   Temp 36.7 °C (98 °F)   Ht 1.753 m (5' 9\")   Wt 93.4 kg (206 lb)   SpO2 97%   BMI 30.42 kg/m²      Physical Exam   Constitutional: He is oriented to person, place, and time. He appears well-developed and well-nourished. No distress.   HENT:   Head: Normocephalic and atraumatic.   Right Ear: External ear and ear canal normal. Tympanic membrane is not injected and not perforated. No middle ear effusion.   Left Ear: External ear and ear canal normal. Tympanic membrane is not injected and not perforated.  No middle ear effusion.   Eyes: Conjunctivae are normal. Right eye exhibits no discharge. Left eye exhibits no discharge.   Slit lamp exam:       The right eye shows no corneal abrasion and no fluorescein uptake.   Neck: Normal range of motion. Neck supple.   Cardiovascular: Normal rate, regular rhythm and normal heart sounds.    No murmur heard.  Pulmonary/Chest: Effort normal and breath sounds normal. No respiratory distress.   Musculoskeletal: Normal range of motion.   Normal movement of all 4 extremities.   Lymphadenopathy:     He has no cervical adenopathy.        Right: No supraclavicular adenopathy present.        Left: No supraclavicular adenopathy present.   Neurological: He is alert and oriented to person, place, and time. Gait " normal.   Skin: Skin is warm and dry.   Psychiatric: He has a normal mood and affect. His behavior is normal. Thought content normal.   Nursing note and vitals reviewed.              Assessment/Plan:     1. Discomfort of right eye       No corneal abrasion or foreign body on exam appreciated.  Patient referred to family eye care in [place] for evaluation as they will see him today.  Differential diagnosis, natural history, supportive care, and indications for immediate follow-up discussed at length.

## 2018-12-09 ENCOUNTER — PATIENT MESSAGE (OUTPATIENT)
Dept: MEDICAL GROUP | Facility: MEDICAL CENTER | Age: 58
End: 2018-12-09

## 2018-12-09 DIAGNOSIS — F51.01 PRIMARY INSOMNIA: ICD-10-CM

## 2018-12-10 NOTE — TELEPHONE ENCOUNTER
From: Horacio Mcgarry  To: Daniel Valentin M.D.  Sent: 12/9/2018 9:55 AM PST  Subject: Prescription Question    Hi Dr. valentin, hope all is well,   I am out of refills for triazolam 0.25 MG Tabs, for sleep, can I get it refilled? Thank You   Horacio Mcgarry  [phone]

## 2019-03-22 DIAGNOSIS — Z01.812 PRE-OPERATIVE LABORATORY EXAMINATION: ICD-10-CM

## 2019-03-22 LAB
ANION GAP SERPL CALC-SCNC: 8 MMOL/L (ref 0–11.9)
BUN SERPL-MCNC: 16 MG/DL (ref 8–22)
CALCIUM SERPL-MCNC: 8.7 MG/DL (ref 8.5–10.5)
CHLORIDE SERPL-SCNC: 105 MMOL/L (ref 96–112)
CO2 SERPL-SCNC: 28 MMOL/L (ref 20–33)
CREAT SERPL-MCNC: 0.81 MG/DL (ref 0.5–1.4)
ERYTHROCYTE [DISTWIDTH] IN BLOOD BY AUTOMATED COUNT: 41.9 FL (ref 35.9–50)
GLUCOSE SERPL-MCNC: 77 MG/DL (ref 65–99)
HCT VFR BLD AUTO: 48.9 % (ref 42–52)
HGB BLD-MCNC: 17.4 G/DL (ref 14–18)
MCH RBC QN AUTO: 32.2 PG (ref 27–33)
MCHC RBC AUTO-ENTMCNC: 35.6 G/DL (ref 33.7–35.3)
MCV RBC AUTO: 90.6 FL (ref 81.4–97.8)
PLATELET # BLD AUTO: 207 K/UL (ref 164–446)
PMV BLD AUTO: 9.4 FL (ref 9–12.9)
POTASSIUM SERPL-SCNC: 4 MMOL/L (ref 3.6–5.5)
RBC # BLD AUTO: 5.4 M/UL (ref 4.7–6.1)
SODIUM SERPL-SCNC: 141 MMOL/L (ref 135–145)
WBC # BLD AUTO: 5.1 K/UL (ref 4.8–10.8)

## 2019-03-22 PROCEDURE — 85027 COMPLETE CBC AUTOMATED: CPT

## 2019-03-22 PROCEDURE — 80048 BASIC METABOLIC PNL TOTAL CA: CPT

## 2019-03-22 PROCEDURE — 36415 COLL VENOUS BLD VENIPUNCTURE: CPT

## 2019-03-22 RX ORDER — DEXLANSOPRAZOLE 60 MG/1
1 CAPSULE, DELAYED RELEASE ORAL DAILY
COMMUNITY

## 2019-04-04 ENCOUNTER — ANESTHESIA EVENT (OUTPATIENT)
Dept: SURGERY | Facility: MEDICAL CENTER | Age: 59
End: 2019-04-04
Payer: COMMERCIAL

## 2019-04-05 ENCOUNTER — ANESTHESIA (OUTPATIENT)
Dept: SURGERY | Facility: MEDICAL CENTER | Age: 59
End: 2019-04-05
Payer: COMMERCIAL

## 2019-04-05 ENCOUNTER — HOSPITAL ENCOUNTER (OUTPATIENT)
Facility: MEDICAL CENTER | Age: 59
End: 2019-04-05
Attending: SURGERY | Admitting: SURGERY
Payer: COMMERCIAL

## 2019-04-05 VITALS
TEMPERATURE: 98.2 F | RESPIRATION RATE: 14 BRPM | DIASTOLIC BLOOD PRESSURE: 72 MMHG | BODY MASS INDEX: 30.89 KG/M2 | OXYGEN SATURATION: 96 % | SYSTOLIC BLOOD PRESSURE: 142 MMHG | HEART RATE: 66 BPM | HEIGHT: 69 IN | WEIGHT: 208.56 LBS

## 2019-04-05 DIAGNOSIS — G89.18 POSTOPERATIVE PAIN: ICD-10-CM

## 2019-04-05 LAB — PATHOLOGY CONSULT NOTE: NORMAL

## 2019-04-05 PROCEDURE — 700101 HCHG RX REV CODE 250

## 2019-04-05 PROCEDURE — 502571 HCHG PACK, LAP CHOLE: Performed by: SURGERY

## 2019-04-05 PROCEDURE — 501838 HCHG SUTURE GENERAL: Performed by: SURGERY

## 2019-04-05 PROCEDURE — 700111 HCHG RX REV CODE 636 W/ 250 OVERRIDE (IP)

## 2019-04-05 PROCEDURE — 501577 HCHG TROCAR, STEP 11MM: Performed by: SURGERY

## 2019-04-05 PROCEDURE — 160048 HCHG OR STATISTICAL LEVEL 1-5: Performed by: SURGERY

## 2019-04-05 PROCEDURE — 160002 HCHG RECOVERY MINUTES (STAT): Performed by: SURGERY

## 2019-04-05 PROCEDURE — 500521 HCHG ENDOSTITCH LOAD UNIT: Performed by: SURGERY

## 2019-04-05 PROCEDURE — 700111 HCHG RX REV CODE 636 W/ 250 OVERRIDE (IP): Performed by: ANESTHESIOLOGY

## 2019-04-05 PROCEDURE — 160009 HCHG ANES TIME/MIN: Performed by: SURGERY

## 2019-04-05 PROCEDURE — A9270 NON-COVERED ITEM OR SERVICE: HCPCS | Performed by: ANESTHESIOLOGY

## 2019-04-05 PROCEDURE — A6402 STERILE GAUZE <= 16 SQ IN: HCPCS | Performed by: SURGERY

## 2019-04-05 PROCEDURE — 160046 HCHG PACU - 1ST 60 MINS PHASE II: Performed by: SURGERY

## 2019-04-05 PROCEDURE — 160029 HCHG SURGERY MINUTES - 1ST 30 MINS LEVEL 4: Performed by: SURGERY

## 2019-04-05 PROCEDURE — 500868 HCHG NEEDLE, SURGI(VARES): Performed by: SURGERY

## 2019-04-05 PROCEDURE — 160041 HCHG SURGERY MINUTES - EA ADDL 1 MIN LEVEL 4: Performed by: SURGERY

## 2019-04-05 PROCEDURE — 160025 RECOVERY II MINUTES (STATS): Performed by: SURGERY

## 2019-04-05 PROCEDURE — 700101 HCHG RX REV CODE 250: Performed by: ANESTHESIOLOGY

## 2019-04-05 PROCEDURE — 700102 HCHG RX REV CODE 250 W/ 637 OVERRIDE(OP): Performed by: ANESTHESIOLOGY

## 2019-04-05 PROCEDURE — 160035 HCHG PACU - 1ST 60 MINS PHASE I: Performed by: SURGERY

## 2019-04-05 PROCEDURE — 500522 HCHG ENDOSTITCH SUTURING DEVICE: Performed by: SURGERY

## 2019-04-05 PROCEDURE — 501664 HCHG TUBING, FILTER STRYKER: Performed by: SURGERY

## 2019-04-05 PROCEDURE — 88305 TISSUE EXAM BY PATHOLOGIST: CPT

## 2019-04-05 PROCEDURE — 501445 HCHG STAPLER, SKIN DISP: Performed by: SURGERY

## 2019-04-05 PROCEDURE — 501583 HCHG TROCAR, THRD CAN&SEAL 5X100: Performed by: SURGERY

## 2019-04-05 PROCEDURE — 501570 HCHG TROCAR, SEPARATOR: Performed by: SURGERY

## 2019-04-05 RX ORDER — ONDANSETRON 2 MG/ML
INJECTION INTRAMUSCULAR; INTRAVENOUS PRN
Status: DISCONTINUED | OUTPATIENT
Start: 2019-04-05 | End: 2019-04-05 | Stop reason: SURG

## 2019-04-05 RX ORDER — OXYCODONE HCL 5 MG/5 ML
5 SOLUTION, ORAL ORAL
Status: COMPLETED | OUTPATIENT
Start: 2019-04-05 | End: 2019-04-05

## 2019-04-05 RX ORDER — ACETAMINOPHEN 500 MG
1000 TABLET ORAL ONCE
Status: COMPLETED | OUTPATIENT
Start: 2019-04-05 | End: 2019-04-05

## 2019-04-05 RX ORDER — HYDROMORPHONE HYDROCHLORIDE 1 MG/ML
0.2 INJECTION, SOLUTION INTRAMUSCULAR; INTRAVENOUS; SUBCUTANEOUS
Status: DISCONTINUED | OUTPATIENT
Start: 2019-04-05 | End: 2019-04-05 | Stop reason: HOSPADM

## 2019-04-05 RX ORDER — SODIUM CHLORIDE, SODIUM LACTATE, POTASSIUM CHLORIDE, CALCIUM CHLORIDE 600; 310; 30; 20 MG/100ML; MG/100ML; MG/100ML; MG/100ML
INJECTION, SOLUTION INTRAVENOUS CONTINUOUS
Status: DISCONTINUED | OUTPATIENT
Start: 2019-04-05 | End: 2019-04-05 | Stop reason: HOSPADM

## 2019-04-05 RX ORDER — HYDROMORPHONE HYDROCHLORIDE 1 MG/ML
0.4 INJECTION, SOLUTION INTRAMUSCULAR; INTRAVENOUS; SUBCUTANEOUS
Status: DISCONTINUED | OUTPATIENT
Start: 2019-04-05 | End: 2019-04-05 | Stop reason: HOSPADM

## 2019-04-05 RX ORDER — LORAZEPAM 2 MG/ML
0.5 INJECTION INTRAMUSCULAR
Status: DISCONTINUED | OUTPATIENT
Start: 2019-04-05 | End: 2019-04-05 | Stop reason: HOSPADM

## 2019-04-05 RX ORDER — HALOPERIDOL 5 MG/ML
1 INJECTION INTRAMUSCULAR
Status: DISCONTINUED | OUTPATIENT
Start: 2019-04-05 | End: 2019-04-05 | Stop reason: HOSPADM

## 2019-04-05 RX ORDER — DEXAMETHASONE SODIUM PHOSPHATE 4 MG/ML
INJECTION, SOLUTION INTRA-ARTICULAR; INTRALESIONAL; INTRAMUSCULAR; INTRAVENOUS; SOFT TISSUE PRN
Status: DISCONTINUED | OUTPATIENT
Start: 2019-04-05 | End: 2019-04-05 | Stop reason: SURG

## 2019-04-05 RX ORDER — CEFAZOLIN SODIUM 1 G/3ML
INJECTION, POWDER, FOR SOLUTION INTRAMUSCULAR; INTRAVENOUS PRN
Status: DISCONTINUED | OUTPATIENT
Start: 2019-04-05 | End: 2019-04-05 | Stop reason: SURG

## 2019-04-05 RX ORDER — BUPIVACAINE HYDROCHLORIDE AND EPINEPHRINE 5; 5 MG/ML; UG/ML
INJECTION, SOLUTION EPIDURAL; INTRACAUDAL; PERINEURAL
Status: DISCONTINUED | OUTPATIENT
Start: 2019-04-05 | End: 2019-04-05 | Stop reason: HOSPADM

## 2019-04-05 RX ORDER — HYDROMORPHONE HYDROCHLORIDE 1 MG/ML
0.1 INJECTION, SOLUTION INTRAMUSCULAR; INTRAVENOUS; SUBCUTANEOUS
Status: DISCONTINUED | OUTPATIENT
Start: 2019-04-05 | End: 2019-04-05 | Stop reason: HOSPADM

## 2019-04-05 RX ORDER — HYDROMORPHONE HYDROCHLORIDE 2 MG/ML
INJECTION, SOLUTION INTRAMUSCULAR; INTRAVENOUS; SUBCUTANEOUS PRN
Status: DISCONTINUED | OUTPATIENT
Start: 2019-04-05 | End: 2019-04-05 | Stop reason: SURG

## 2019-04-05 RX ORDER — ONDANSETRON 2 MG/ML
4 INJECTION INTRAMUSCULAR; INTRAVENOUS
Status: DISCONTINUED | OUTPATIENT
Start: 2019-04-05 | End: 2019-04-05 | Stop reason: HOSPADM

## 2019-04-05 RX ORDER — LIDOCAINE HYDROCHLORIDE 40 MG/ML
SOLUTION TOPICAL PRN
Status: DISCONTINUED | OUTPATIENT
Start: 2019-04-05 | End: 2019-04-05 | Stop reason: SURG

## 2019-04-05 RX ORDER — LIDOCAINE HYDROCHLORIDE 10 MG/ML
INJECTION, SOLUTION EPIDURAL; INFILTRATION; INTRACAUDAL; PERINEURAL
Status: COMPLETED
Start: 2019-04-05 | End: 2019-04-05

## 2019-04-05 RX ORDER — MEPERIDINE HYDROCHLORIDE 25 MG/ML
12.5 INJECTION INTRAMUSCULAR; INTRAVENOUS; SUBCUTANEOUS
Status: DISCONTINUED | OUTPATIENT
Start: 2019-04-05 | End: 2019-04-05 | Stop reason: HOSPADM

## 2019-04-05 RX ORDER — IPRATROPIUM BROMIDE AND ALBUTEROL SULFATE 2.5; .5 MG/3ML; MG/3ML
3 SOLUTION RESPIRATORY (INHALATION)
Status: DISCONTINUED | OUTPATIENT
Start: 2019-04-05 | End: 2019-04-05 | Stop reason: HOSPADM

## 2019-04-05 RX ORDER — KETOROLAC TROMETHAMINE 30 MG/ML
INJECTION, SOLUTION INTRAMUSCULAR; INTRAVENOUS PRN
Status: DISCONTINUED | OUTPATIENT
Start: 2019-04-05 | End: 2019-04-05 | Stop reason: SURG

## 2019-04-05 RX ORDER — OXYCODONE HCL 5 MG/5 ML
10 SOLUTION, ORAL ORAL
Status: COMPLETED | OUTPATIENT
Start: 2019-04-05 | End: 2019-04-05

## 2019-04-05 RX ORDER — GABAPENTIN 300 MG/1
300 CAPSULE ORAL ONCE
Status: COMPLETED | OUTPATIENT
Start: 2019-04-05 | End: 2019-04-05

## 2019-04-05 RX ADMIN — DEXAMETHASONE SODIUM PHOSPHATE 8 MG: 4 INJECTION, SOLUTION INTRAMUSCULAR; INTRAVENOUS at 08:06

## 2019-04-05 RX ADMIN — ROCURONIUM BROMIDE 40 MG: 10 INJECTION, SOLUTION INTRAVENOUS at 08:09

## 2019-04-05 RX ADMIN — KETOROLAC TROMETHAMINE 30 MG: 30 INJECTION, SOLUTION INTRAMUSCULAR at 09:05

## 2019-04-05 RX ADMIN — CEFAZOLIN 2 G: 330 INJECTION, POWDER, FOR SOLUTION INTRAMUSCULAR; INTRAVENOUS at 08:05

## 2019-04-05 RX ADMIN — ROCURONIUM BROMIDE 10 MG: 10 INJECTION, SOLUTION INTRAVENOUS at 08:43

## 2019-04-05 RX ADMIN — LIDOCAINE HYDROCHLORIDE 4 ML: 40 SOLUTION TOPICAL at 08:02

## 2019-04-05 RX ADMIN — FENTANYL CITRATE 50 MCG: 50 INJECTION, SOLUTION INTRAMUSCULAR; INTRAVENOUS at 08:33

## 2019-04-05 RX ADMIN — ACETAMINOPHEN 1000 MG: 500 TABLET, FILM COATED ORAL at 06:59

## 2019-04-05 RX ADMIN — HYDROMORPHONE HYDROCHLORIDE 0.4 MG: 2 INJECTION, SOLUTION INTRAMUSCULAR; INTRAVENOUS; SUBCUTANEOUS at 08:45

## 2019-04-05 RX ADMIN — ONDANSETRON 4 MG: 2 INJECTION INTRAMUSCULAR; INTRAVENOUS at 08:25

## 2019-04-05 RX ADMIN — Medication 0.5 ML: at 06:59

## 2019-04-05 RX ADMIN — LIDOCAINE HYDROCHLORIDE 0.5 ML: 10 INJECTION, SOLUTION EPIDURAL; INFILTRATION; INTRACAUDAL; PERINEURAL at 06:59

## 2019-04-05 RX ADMIN — OXYCODONE HYDROCHLORIDE 10 MG: 5 SOLUTION ORAL at 09:29

## 2019-04-05 RX ADMIN — HYDROMORPHONE HYDROCHLORIDE 0.4 MG: 2 INJECTION, SOLUTION INTRAMUSCULAR; INTRAVENOUS; SUBCUTANEOUS at 09:20

## 2019-04-05 RX ADMIN — FENTANYL CITRATE 100 MCG: 50 INJECTION, SOLUTION INTRAMUSCULAR; INTRAVENOUS at 07:57

## 2019-04-05 RX ADMIN — PROPOFOL 200 MG: 10 INJECTION, EMULSION INTRAVENOUS at 08:01

## 2019-04-05 RX ADMIN — SODIUM CHLORIDE, SODIUM LACTATE, POTASSIUM CHLORIDE, CALCIUM CHLORIDE: 600; 310; 30; 20 INJECTION, SOLUTION INTRAVENOUS at 07:56

## 2019-04-05 RX ADMIN — GABAPENTIN 300 MG: 300 CAPSULE ORAL at 06:59

## 2019-04-05 RX ADMIN — SUGAMMADEX 200 MG: 100 INJECTION, SOLUTION INTRAVENOUS at 09:09

## 2019-04-05 RX ADMIN — SODIUM CHLORIDE, SODIUM LACTATE, POTASSIUM CHLORIDE, CALCIUM CHLORIDE: 600; 310; 30; 20 INJECTION, SOLUTION INTRAVENOUS at 06:59

## 2019-04-05 RX ADMIN — LIDOCAINE HYDROCHLORIDE 100 MG: 20 INJECTION, SOLUTION INTRAVENOUS at 08:01

## 2019-04-05 RX ADMIN — SUCCINYLCHOLINE CHLORIDE 200 MG: 20 INJECTION, SOLUTION INTRAMUSCULAR; INTRAVENOUS at 08:01

## 2019-04-05 ASSESSMENT — PAIN SCALES - GENERAL: PAIN_LEVEL: 2

## 2019-04-05 NOTE — DISCHARGE INSTRUCTIONS
ACTIVITY: Rest and take it easy for the first 24 hours.  A responsible adult is recommended to remain with you during that time.  It is normal to feel sleepy.  We encourage you to not do anything that requires balance, judgment or coordination.    MILD FLU-LIKE SYMPTOMS ARE NORMAL. YOU MAY EXPERIENCE GENERALIZED MUSCLE ACHES, THROAT IRRITATION, HEADACHE AND/OR SOME NAUSEA.    FOR 24 HOURS DO NOT:  Drive, operate machinery or run household appliances.  Drink beer or alcoholic beverages.   Make important decisions or sign legal documents.    SPECIAL INSTRUCTIONS:     Discharge Instructions for Laparoscopic Nissen Fundoplication  Laparoscopic Nissen fundoplication is done to treat gastroesophageal reflux disease (GERD). GERD happens when food or stomach acid backs up (refluxes) from your stomach into your esophagus. This reflux can damage your esophagus. For the procedure, a few small incisions were made in your belly. Working through these incisions, the surgeon wrapped the upper part of your stomach around the esophagus. This creates pressure on the esophagus, helping hold it closed. The pressure helps prevent food and stomach acid from refluxing.   Activity  Here are suggestions for what you can and can't do as you recover from surgery:  · You will likely want to take it easy for 3 to 5 days. You can go back to your normal daily routine when you are feeling well enough. When you can return to work depends on what type of work you do. Check with your healthcare provider.  · Walk as often as you feel able. This will help with your recovery.  · Don’t drive while you are still taking opioid pain medicine. Drive when you are sure you can hit the brake pedal without wincing or hesitating.   · Continue the coughing and deep breathing exercises that you learned in the hospital.  Bandage and incision care  Your incisions will be covered by plastic bandages or strips of tape:  · Do not get the bandage or wound wet for 48  hours or as advised by your healthcare provider.  · You can remove plastic bandages in 48 hours or as advised by your healthcare provider. If strips of tape were used to close your incisions, don’t pull them off. Let them fall off on their own.  · Once you can get the incision wet, very gently wash your incisions with mild soap and water. Pat them dry. Don’t use oils, powders, or lotions on your incisions.  Medicine use  Take all prescribed medicines as directed:  · Until you can swallow easily, take liquid medicines. If you are given pills, crush them and swallow them with liquids. But some pills can't or shouldn't be crushed. Be sure to check with your healthcare provider or pharmacist before crushing pills.  · If you are given pain medicine, take it on time as directed. Do not wait for pain to be severe before you take it.  · If you are prescribed antibiotics to fight or prevent infection, be sure to take all the medicine until it is gone.  · If you have been on medicines to prevent reflux, ask your healthcare provider whether you should stop taking them.  Eating and drinking  At first, swallowing will be hard. This will improve as you recover. You may also have belly bloating and pain, as you will not be able to belch for a time after surgery. Follow any guidelines your healthcare provider gives you for what to eat and what to avoid during your recovery:  · Follow a liquid diet as instructed. Add solid foods back into your diet as you can tolerate them and as instructed by your healthcare provider.  · Avoid drinking iced, hot, or carbonated beverages. This is to help prevent discomfort in your belly. Ask your healthcare provider what foods you can eat and drink right after your surgery.  · Take small bites and chew your food well. Be sure to swallow the last bite before taking another. Sip water with your meals to help with swallowing.  · Avoid foods that stick together. These include peanut butter, bread  products, and dry meats. They can be tough to swallow.  · Limit foods that produce gas. These include tomato products, fatty or spicy foods, caffeine, alcohol, onions, green peppers, beans, nuts, and raw fruits and vegetables.  · Sit up straight while eating. Stay upright for at least 20 minutes after a meal.  · If eating makes you feel bloated, try several small meals a day instead of 3 large meals.  Keep follow-up appointments  Keep your follow-up appointments. These allow your healthcare provider to check your progress and make sure you’re healing well. During office visits, tell your healthcare provider if you have any new symptoms or any reflux. And be sure to ask any questions you have.    When to call your healthcare provider  Call your healthcare provider right away if you have any of the following:  · Chest pain or trouble breathing (Call 911)   · Reflux symptoms that continue or return  · A large amount of abdominal swelling or pain, especially pain after coughing  · Bleeding  · Belly that becomes tender or feels hard  · Increased redness or drainage of the incision  · Fever of 100.4°F (38°C) or higher, or as advised by your healthcare provider  · Inability to drink or eat  · Bowel movements that are black or bloody  · Pain or tenderness in your legs     DIET: To avoid nausea, slowly advance diet as tolerated, avoiding spicy or greasy foods for the first day.  Add more substantial food to your diet according to your physician's instructions.  Babies can be fed formula or breast milk as soon as they are hungry.  INCREASE FLUIDS AND FIBER TO AVOID CONSTIPATION.    SURGICAL DRESSING/BATHING: keep dressing clean and dry. May shower. No hot tubs, tub baths or pools until cleared by MD    FOLLOW-UP APPOINTMENT:  A follow-up appointment should be arranged with your doctor; call to schedule.    You should CALL YOUR PHYSICIAN if you develop:  Fever greater than 101 degrees F.  Pain not relieved by medication, or  persistent nausea or vomiting.  Excessive bleeding (blood soaking through dressing) or unexpected drainage from the wound.  Extreme redness or swelling around the incision site, drainage of pus or foul smelling drainage.  Inability to urinate or empty your bladder within 8 hours.  Problems with breathing or chest pain.    You should call 911 if you develop problems with breathing or chest pain.  If you are unable to contact your doctor or surgical center, you should go to the nearest emergency room or urgent care center.  Physician's telephone #: *Dr. Ganser [phone]*    If any questions arise, call your doctor.  If your doctor is not available, please feel free to call the Surgical Center at [phone].  The Center is open Monday through Friday from 7AM to 7PM.  You can also call the [employer] HOTLINE open 24 hours/day, 7 days/week and speak to a nurse at [phone], or toll free at [phone].    A registered nurse may call you a few days after your surgery to see how you are doing after your procedure.    MEDICATIONS: Resume taking daily medication.  Take prescribed pain medication with food.  If no medication is prescribed, you may take non-aspirin pain medication if needed.  PAIN MEDICATION CAN BE VERY CONSTIPATING.  Take a stool softener or laxative such as senokot, pericolace, or milk of magnesia if needed.    Prescription given for *Lortab*.  Last pain medication given at 9:29 am .    If your physician has prescribed pain medication that includes Acetaminophen (Tylenol), do not take additional Acetaminophen (Tylenol) while taking the prescribed medication.    Depression / Suicide Risk    As you are discharged from this [facility] facility, it is important to learn how to keep safe from harming yourself.    Recognize the warning signs:  · Abrupt changes in personality, positive or negative- including increase in energy   · Giving away possessions  · Change in eating patterns- significant weight  changes-  positive or negative  · Change in sleeping patterns- unable to sleep or sleeping all the time   · Unwillingness or inability to communicate  · Depression  · Unusual sadness, discouragement and loneliness  · Talk of wanting to die  · Neglect of personal appearance   · Rebelliousness- reckless behavior  · Withdrawal from people/activities they love  · Confusion- inability to concentrate     If you or a loved one observes any of these behaviors or has concerns about self-harm, here's what you can do:  · Talk about it- your feelings and reasons for harming yourself  · Remove any means that you might use to hurt yourself (examples: pills, rope, extension cords, firearm)  · Get professional help from the community (Mental Health, Substance Abuse, psychological counseling)  · Do not be alone:Call your Safe Contact- someone whom you trust who will be there for you.  · Call your local CRISIS HOTLINE 164-3669 or 940-707-0115  · Call your local Children's Mobile Crisis Response Team [place] (329) 244-2282 or www.[employer]  · Call the toll free National Suicide Prevention Hotlines   · National Suicide Prevention Lifeline 823-288-UYTX (6862)  · National [facility] Line Network 800-SUICIDE (304-1867)

## 2019-04-05 NOTE — OP REPORT
DATE OF SERVICE:  04/05/2019    PREOPERATIVE DIAGNOSES:  1.  Gastroesophageal reflux disease.  2.  Periesophageal duplication cyst.    POSTOPERATIVE DIAGNOSES:  1.  Gastroesophageal reflux disease.  2.  Periesophageal duplication cyst.    PROCEDURES:  1.  Laparoscopic hiatal hernia repair with Nissen fundoplication.  2.  Laparoscopic resection of periesophageal cyst, 2.5 cm.    SURGEON:  John H. Ganser, MD    ASSISTANT:  Joshua Ly PA-C    ANESTHESIA:  General.    ANESTHESIOLOGIST:  Merly Reeves MD    INDICATIONS:  The patient is a 58-year-old male, who has developed severe acid   reflux disease, confirmed on a 24-hour pH.  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  This confirmed on CT scan.  Manometry was good.  We   discussed risks, benefits, and alternatives to Nissen fundoplication with   excision of the distal periesophageal duplication cyst and wished to proceed.    FINDINGS:  There was a sliding hiatal hernia.  There were about a 2.5 cm cyst   within the muscle wall and the anterior esophagus.  This was able to be   removed entirely and sent for permanent histology.  A 360-degree   fundoplication was carried out over a 54-Khmer Bougie after hiatal repair was   carried out.    DESCRIPTION OF PROCEDURE:  Patient was identified and general anesthetic   administered.  His abdomen was prepped and draped in usual sterile fashion.    Local anesthesia of 0.5% Marcaine with epinephrine was injected prior to   making skin incision.  Small incision was made in the midepigastric region,   left midline and the Veress needle passed.  The abdomen was insufflated with   carbon dioxide without incident and a 5 mm blunt trocar and 5 mm 30-degree   scope inserted.  Carolyn liver retractor was passed through small subxiphoid   incision, used to elevate the  lateral segment of liver.  Right upper quadrant   5, left upper quadrant 11, and left lateral subcostal 5 mm trocars were   placed.  Above findings were noted.  Dissection was begun by dividing the   gastrohepatic ligament using the Harmonic scalpel, which was used for all of   the dissection.  The right lisseth muscle was identified and carefully dissected   away from the esophagus.  There was an obvious mass in the anterior portion.    Care was taken to mobilize all of the phrenoesophageal membrane and mobilize   hiatus circumferentially without injury to the cyst or vagus nerves.    Circumferential dissection was carried around the esophagus well into the   mediastinum allowing the gastroesophageal junction to be brought well below   the hiatus under no tension upwards.  Cyst was about 2.5 cm and was just above   the gastroesophageal junction and appeared to be emanating from within the   muscular wall of the esophagus.  Careful dissection was carried out staying   right on the wall of the cyst and it was able to be removed entirely.  It was   opened at one point and contained thick yellow fluid, which was suctioned.    There appeared to be a thin layer of muscle still intact overlying mucosa.    The short gastric vessels were taken down the superior aspect of the fundus to   allow for fundoplication, hemostasis assured.  Posterior hiatal hernia repair   was carried out with 0 Surgidac using the EndoStitch device placing 3   sutures.  Fundoplication was then carried out rotating the fundus from left to   right behind the esophagus maintaining the anterior and posterior   orientation.  A 54-Lithuanian Bougie was then passed by anesthesia, wrap was   brought anteriorly and sutured together and down to the esophagus above the   area where the cyst been removed with the first suture.  Two additional   sutures were placed, closing the muscle overlying the defect that creating an   appropriate wrap.  The suture was then  placed in the undersurface of the wrap   that left in the esophagus to help prevent any slippage there.  The abdomen   was irrigated and hemostasis assured.  The Bougie was removed and the wrap   maintained good orientation with no torsion or tension upwards.  Tacking   sutures were placed between the wrap and the hiatus in the posterior and   anterior aspects to help prevent any herniation.  The abdomen was irrigated,   hemostasis assured.  Liver retractor and trocars were withdrawn, the abdomen   deflated, and incisions closed with Vicryl.  Sterile dressings were applied.    Patient returned to recovery room in stable condition.       ____________________________________     JOHN H. GANSER, MD [facility] / [facility]    DD:  04/05/2019 09:20:02  DT:  04/05/2019 10:44:13    D#:  [id number]  Job#:  [id number]    cc: Daniel Hernandez MD, Mariusz Sarmiento MD

## 2019-04-05 NOTE — DISCHARGE INSTR - OTHER INFO
Clears without carbonation today. Please ensure patient able to tolerate several ounces (small sips at a time) of water/clears without dysphagia or vomiting/regurgitation before sending home.  Advance diet as tolerated to full liquids tomorrow. Follow diet progression handout given at preop appt  Up ad emily.  Ok to Shower over Tegaderms ; remove Tegaderms on 4/08/19.  No baths or soaks x 14 days.  No driving x 4-5 days.  No lifting > 15 lbs until x 4 weeks.  D/C home when alert, comfortable, ambulatory, and tolerating PO well  Pt Counseled re: I.S., diet, activity, home med's, and wound care  Begin PPI at home (all home med's larger in size than eraser head should be crushed or changed to liquid form x 2-3 weeks, while on liquid diet)  Hold MVI/supplements until after postop check.  F/U with Dr. Ganser ~ 1-2 weeks

## 2019-04-05 NOTE — ANESTHESIA PREPROCEDURE EVALUATION
Relevant Problems   (+) GERD (gastroesophageal reflux disease)   (+) Renal calculus or stone       Physical Exam    Airway   Mallampati: II  TM distance: >3 FB  Neck ROM: full       Cardiovascular - normal exam  Rhythm: regular  Rate: normal  (-) murmur     Dental - normal exam         Pulmonary - normal exam  Breath sounds clear to auscultation     Abdominal    Neurological - normal exam                 Anesthesia Plan    ASA 2       Plan - general       Airway plan will be ETT        Induction: intravenous    Postoperative Plan: Postoperative administration of opioids is intended.    Pertinent diagnostic labs and testing reviewed    Informed Consent:    Anesthetic plan and risks discussed with patient.    Use of blood products discussed with: patient whom consented to blood products.

## 2019-04-05 NOTE — OR NURSING
Pre-op assessment completed. POC discussed with pt and pt's wife. Call light in reach; hourly rounding in place.

## 2019-04-05 NOTE — ANESTHESIA PROCEDURE NOTES
Airway  Date/Time: 4/5/2019 8:02 AM  Performed by: GAYATHRI BACK  Authorized by: GAYATHRI BACK     Location:  OR  Urgency:  Elective  Difficult Airway: No    Indications for Airway Management:  Anesthesia  Spontaneous Ventilation: absent    Sedation Level:  Deep  Preoxygenated: Yes    Patient Position:  Sniffing  Mask Difficulty Assessment:  0 - not attempted  Final Airway Type:  Endotracheal airway  Final Endotracheal Airway:  ETT  Cuffed: Yes    Technique Used for Successful ETT Placement:  Direct laryngoscopy  Devices/Methods Used in Placement:  Intubating stylet  Insertion Site:  Oral  Blade Type:  Tila  Laryngoscope Blade/Videolaryngoscope Blade Size:  4  ETT Size (mm):  7.5  Measured from:  Teeth  ETT to Teeth (cm):  22  Placement Verified by: auscultation and capnometry    Cormack-Lehane Classification:  Grade I - full view of glottis  Number of Attempts at Approach:  1

## 2019-04-05 NOTE — ANESTHESIA POSTPROCEDURE EVALUATION
Patient: Horacio Mcgarry    Procedure Summary     Date:  04/05/19 Room / Location:  [place] [address] / SURGERY [facility]    Anesthesia Start:  0757 Anesthesia Stop:  0923    Procedures:       FUNDOPLICATION, NISSEN, LAPAROSCOPIC (N/A Abdomen)      EXCISION, CYST- ESOPHAGEAL POSS (N/A Abdomen) Diagnosis:  (ESOPHAGEAL REFLUX)    Surgeon:  John H Ganser, M.D. Responsible Provider:  Merly Reeves M.D.    Anesthesia Type:  general ASA Status:  2          Final Anesthesia Type: general  Last vitals  BP   Blood Pressure: 125/88, NIBP: 151/78    Temp   36.7 °C (98.1 °F)    Pulse   Pulse: 76, Heart Rate (Monitored): 74   Resp   12    SpO2   98 %      Anesthesia Post Evaluation    Patient location during evaluation: PACU  Patient participation: complete - patient participated  Level of consciousness: awake and alert  Pain score: 2    Airway patency: patent  Anesthetic complications: no  Cardiovascular status: hemodynamically stable  Respiratory status: acceptable  Hydration status: euvolemic    PONV: none           Nurse Pain Score: 0 (NPRS)

## 2019-04-05 NOTE — ANESTHESIA TIME REPORT
Anesthesia Start and Stop Event Times     Date Time Event    4/5/2019 0757 Anesthesia Start     0923 Anesthesia Stop        Responsible Staff  04/05/19    Name Role Begin End    Merly Reeves M.D. Anesth 0757 0923        Preop Diagnosis (Free Text):  Pre-op Diagnosis     ESOPHAGEAL REFLUX        Preop Diagnosis (Codes):  Diagnosis Information     Diagnosis Code(s):         Post op Diagnosis  Esophageal reflux      Premium Reason  Non-Premium    Comments:

## 2019-04-05 NOTE — OR SURGEON
Immediate Post OP Note    PreOp Diagnosis: GERD with hiatal hernia, periesophageal duplication cyst    PostOp Diagnosis: Same    Procedure(s):  FUNDOPLICATION, NISSEN, LAPAROSCOPIC - Wound Class: Clean  EXCISION, CYST- PERIESOPHAGEAL  - Wound Class: Clean    Surgeon(s):  John H Ganser, M.D.    Anesthesiologist/Type of Anesthesia:  Anesthesiologist: Merly Reeves M.D./General    Surgical Staff:  Assistant: ABBIE Parr  Circulator: Dada Hicsk R.N.  Scrub Person: Robyn Burgos    Specimens removed if any:  ID Type Source Tests Collected by Time Destination   A : periesophageal cyst Other Other PATHOLOGY SPECIMEN John H Ganser, M.D. 4/5/2019  8:43 AM        Estimated Blood Loss: 10ml    Findings: Cyst anterior wall of stomach above GEJ containing thick yellow fluid    Complications: 0        4/5/2019 9:12 AM John H Ganser, M.D.

## 2019-04-05 NOTE — OR NURSING
Assumed care of patient at approx 1038.  Patient alert and oriented x 4. See flowsheets for VS.Pain is rated 0/10.     Call light and personal belongings within reach. Gurney in lowest position. Monitor alarms set appropriately.    Dressing is CDI . See flowsheets for detailed wound documentation.

## 2019-05-23 ENCOUNTER — PATIENT MESSAGE (OUTPATIENT)
Dept: MEDICAL GROUP | Facility: MEDICAL CENTER | Age: 59
End: 2019-05-23

## 2019-05-23 DIAGNOSIS — F51.01 PRIMARY INSOMNIA: ICD-10-CM

## 2019-05-24 NOTE — TELEPHONE ENCOUNTER
From: Horacio Mcgarry  To: Daniel Hernandez M.D.  Sent: 5/23/2019 4:20 PM PDT  Subject: Prescription Question    Hi Dr. Hernandez,   Can I get a refill on the Halcion? I am retiring at the end of June and hopefully I can get my sleep straightened out. This is the only medication that I am taking now.   Thanks   Horacio Mcgarry  [phone]

## (undated) DEVICE — DRAPE LAPAROTOMY T SHEET - (12EA/CA)

## (undated) DEVICE — SET LEADWIRE 5 LEAD BEDSIDE DISPOSABLE ECG (1SET OF 5/EA)

## (undated) DEVICE — GOWN SURGICAL X-LARGE ULTRA - FILM-REINFORCED (20/CA)

## (undated) DEVICE — ELECTRODE DUAL RETURN W/ CORD - (50/PK)

## (undated) DEVICE — CLEANER ELECTRO-SURGICAL TIP - (25/BX 4BX/CA)

## (undated) DEVICE — SUTURE GENERAL

## (undated) DEVICE — GLOVE, LITE (PAIR)

## (undated) DEVICE — PENCIL ELECTSURG 10FT BTN SWH - (50/CA)

## (undated) DEVICE — SYRINGE SAFETY 10 ML 18 GA X 1 1/2 BLUNT LL (100/BX 4BX/CA)

## (undated) DEVICE — STAPLER SKIN DISP - (6/BX 10BX/CA) VISISTAT

## (undated) DEVICE — SHEATH URET ACCESS 10/12FR 45 - CM (6/BX)

## (undated) DEVICE — SENSOR SPO2 NEO LNCS ADHESIVE (20/BX) SEE USER NOTES

## (undated) DEVICE — HEAD HOLDER JUNIOR/ADULT

## (undated) DEVICE — GLOVE BIOGEL M SZ 8 SURGICAL PF LTX - (50/BX 4BX/CA)

## (undated) DEVICE — SODIUM CHL IRRIGATION 0.9% 1000ML (12EA/CA)

## (undated) DEVICE — BLADE SURGICAL #15 - (50/BX 3BX/CA)

## (undated) DEVICE — GLOVE BIOGEL INDICATOR SZ 8 SURGICAL PF LTX - (50/BX 4BX/CA)

## (undated) DEVICE — JELLY, KY 2 0Z STERILE

## (undated) DEVICE — GLOVE BIOGEL SZ 6.5 SURGICAL PF LTX (50PR/BX 4BX/CA)

## (undated) DEVICE — GLOVE BIOGEL ECLIPSE  PF LATEX SIZE 6.5 (50PR/BX)

## (undated) DEVICE — KIT ANESTHESIA W/CIRCUIT & 3/LT BAG W/FILTER (20EA/CA)

## (undated) DEVICE — PROTECTOR ULNA NERVE - (36PR/CA)

## (undated) DEVICE — SPONGE GAUZESTER. 2X2 4-PL - (2/PK 50PK/BX 30BX/CS)

## (undated) DEVICE — SUCTION INSTRUMENT YANKAUER BULBOUS TIP W/O VENT (50EA/CA)

## (undated) DEVICE — LACTATED RINGERS INJ 1000 ML - (14EA/CA 60CA/PF)

## (undated) DEVICE — WIRE GUIDE SENSOR DUAL FLEX - 5/BX

## (undated) DEVICE — WATER IRRIGATION STERILE 1000ML (12EA/CA)

## (undated) DEVICE — NEEDLE INSFL 120MM 14GA VRRS - (20/BX)

## (undated) DEVICE — TROCAR 5X100 NON BLADED Z-TH - READ KII (6/BX)

## (undated) DEVICE — KIT ROOM DECONTAMINATION

## (undated) DEVICE — SUTURE 0 VICRYL PLUS CT-2 - 27 INCH (36/BX)

## (undated) DEVICE — SYRINGE 10 ML CONTROL LL (25EA/BX 4BX/CA)

## (undated) DEVICE — SODIUM CHL. IRRIGATION 0.9% 3000ML (4EA/CA 65CA/PF)

## (undated) DEVICE — TRAY SRGPRP PVP IOD WT PRP - (20/CA)

## (undated) DEVICE — WATER IRRIG. STER. 1500 ML - (9/CA)

## (undated) DEVICE — CANNULA W/SEAL 5X100 Z-THRE - ADED KII (12/BX)

## (undated) DEVICE — ENDOSTITCH10MM SUTURING DEVIC - (3/CA)

## (undated) DEVICE — TUBING LAPAROSCOPIC PLUME DEVICE (10EA/CA)

## (undated) DEVICE — GLOVE BIOGEL SZ 7.5 SURGICAL PF LTX - (50PR/BX 4BX/CA)

## (undated) DEVICE — HUMID-VENT HEAT AND MOISTURE EXCHANGE- (50/BX)

## (undated) DEVICE — TUBE NG SALEM SUMP 16FR (50EA/CA)

## (undated) DEVICE — GOWN WARMING STANDARD FLEX - (30/CA)

## (undated) DEVICE — TUBING CLEARLINK DUO-VENT - C-FLO (48EA/CA)

## (undated) DEVICE — WATER IRRIG. STER 3000 ML - (4/CA)

## (undated) DEVICE — CONTAINER, COLLECTION 10-LITER

## (undated) DEVICE — GOWN SURGEONS X-LARGE - DISP. (30/CA)

## (undated) DEVICE — MASK ANESTHESIA ADULT  - (100/CA)

## (undated) DEVICE — SUTURE 4-0 VICRYL PLUS FS-2 - 27 INCH (36/BX)

## (undated) DEVICE — CONTAINER SPECIMEN BAG OR - STERILE 4 OZ W/LID (100EA/CA)

## (undated) DEVICE — CATHETER URET OPEN END 6FR (10EA/BX)

## (undated) DEVICE — GLOVE BIOGEL SZ 6 PF LATEX - (50EA/BX 4BX/CA)

## (undated) DEVICE — GLOVE BIOGEL ECLIPSE PF LATEX SIZE 9.0

## (undated) DEVICE — CATHETER URET DUAL LUMEN

## (undated) DEVICE — ELECTRODE 850 FOAM ADHESIVE - HYDROGEL RADIOTRNSPRNT (50/PK)

## (undated) DEVICE — DRESSING TRANSPARENT FILM TEGADERM 2.375 X 2.75"  (100EA/BX)"

## (undated) DEVICE — SEALER VESSEL HARMONIC ACE PLUS WITH ADVANCED HEMOSTASIS 36CM (1/EA)

## (undated) DEVICE — SET EXTENSION WITH 2 PORTS (48EA/CA) ***PART #2C8610 IS A SUBSTITUTE*****

## (undated) DEVICE — CANISTER SUCTION RIGID RED 1500CC (40EA/CA)

## (undated) DEVICE — SET IRRIGATION CYSTOSCOPY Y-TYPE L81 IN (20EA/CA)

## (undated) DEVICE — TROCAR Z THREAD11MM OPTICAL - NON BLADED(6/BX)

## (undated) DEVICE — CANISTER SUCTION 3000ML MECHANICAL FILTER AUTO SHUTOFF MEDI-VAC NONSTERILE LF DISP  (40EA/CA)

## (undated) DEVICE — TUBING INSUFFLATION - (10/BX)

## (undated) DEVICE — PACK LAP CHOLE OR - (2EA/CA)

## (undated) DEVICE — CATHETER BALLOON PASSPORT KIT

## (undated) DEVICE — SLEEVE, VASO, THIGH, MED

## (undated) DEVICE — NEPTUNE 4 PORT MANIFOLD - (20/PK)

## (undated) DEVICE — BAG URODRAIN WITH TUBING - (20/CA)

## (undated) DEVICE — GLOVE SURGICAL PROTEXIS PI 8.0 LF - (50PR/BX)

## (undated) DEVICE — CHLORAPREP 26 ML APPLICATOR - ORANGE TINT(25/CA)

## (undated) DEVICE — SYRINGE TOOMEY (50EA/CA)

## (undated) DEVICE — PACK CYSTOSCOPY III - (8/CA)

## (undated) DEVICE — SPONGE GAUZESTER 4 X 4 4PLY - (128PK/CA)

## (undated) DEVICE — DETERGENT RENUZYME PLUS 10 OZ PACKET (50/BX)

## (undated) DEVICE — BAG, SPONGE COUNT 50600

## (undated) DEVICE — TUBE CONNECTING SUCTION - CLEAR PLASTIC STERILE 72 IN (50EA/CA)

## (undated) DEVICE — SET SUCTION/IRRIGATION WITH DISPOSABLE TIP (6/CA )PART #0250-070-520 IS A SUB

## (undated) DEVICE — CORDS BIPOLAR COAGULATION - 12FT STERILE DISP. (10EA/BX)

## (undated) DEVICE — SHEATH URET ACCESS 10/12FX35 - (6/BX)

## (undated) DEVICE — ENDOSTITCH LOAD UNIT 0 SURGI - 12/CA

## (undated) DEVICE — GLOVE BIOGEL INDICATOR SZ 7SURGICAL PF LTX - (50/BX 4BX/CA)

## (undated) DEVICE — JELLY, KY 5GM TUBES

## (undated) DEVICE — BLADE SURGICAL CLIPPER - (50EA/CA)